# Patient Record
Sex: FEMALE | Race: WHITE | NOT HISPANIC OR LATINO | Employment: FULL TIME | URBAN - METROPOLITAN AREA
[De-identification: names, ages, dates, MRNs, and addresses within clinical notes are randomized per-mention and may not be internally consistent; named-entity substitution may affect disease eponyms.]

---

## 2018-04-13 RX ORDER — HYDROCHLOROTHIAZIDE 25 MG/1
25 TABLET ORAL DAILY
COMMUNITY
End: 2022-03-09

## 2018-04-16 ENCOUNTER — OFFICE VISIT (OUTPATIENT)
Dept: NEUROSURGERY | Facility: CLINIC | Age: 48
End: 2018-04-16

## 2018-04-16 ENCOUNTER — PREP FOR SURGERY (OUTPATIENT)
Dept: OTHER | Facility: HOSPITAL | Age: 48
End: 2018-04-16

## 2018-04-16 VITALS
DIASTOLIC BLOOD PRESSURE: 88 MMHG | SYSTOLIC BLOOD PRESSURE: 124 MMHG | HEART RATE: 74 BPM | HEIGHT: 65 IN | WEIGHT: 180 LBS | RESPIRATION RATE: 16 BRPM | BODY MASS INDEX: 29.99 KG/M2 | OXYGEN SATURATION: 98 %

## 2018-04-16 DIAGNOSIS — G54.9 MYELORADICULOPATHY: Primary | ICD-10-CM

## 2018-04-16 DIAGNOSIS — M50.20 CERVICAL DISC HERNIATION: Primary | ICD-10-CM

## 2018-04-16 PROCEDURE — 99204 OFFICE O/P NEW MOD 45 MIN: CPT | Performed by: NEUROLOGICAL SURGERY

## 2018-04-16 RX ORDER — HYDROCODONE BITARTRATE AND ACETAMINOPHEN 7.5; 325 MG/1; MG/1
1 TABLET ORAL ONCE
Status: CANCELLED | OUTPATIENT
Start: 2018-04-16 | End: 2018-04-16

## 2018-04-16 RX ORDER — GLIPIZIDE 10 MG/1
10 TABLET, FILM COATED, EXTENDED RELEASE ORAL 2 TIMES DAILY
COMMUNITY
End: 2021-07-21

## 2018-04-16 RX ORDER — IBUPROFEN 800 MG/1
800 TABLET ORAL ONCE
Status: CANCELLED | OUTPATIENT
Start: 2018-04-16 | End: 2018-04-16

## 2018-04-16 RX ORDER — CEFAZOLIN SODIUM 2 G/100ML
2 INJECTION, SOLUTION INTRAVENOUS ONCE
Status: CANCELLED | OUTPATIENT
Start: 2018-04-16 | End: 2018-04-16

## 2018-04-16 RX ORDER — ACETAMINOPHEN 325 MG/1
650 TABLET ORAL ONCE
Status: CANCELLED | OUTPATIENT
Start: 2018-04-16 | End: 2018-04-16

## 2018-04-16 RX ORDER — SODIUM CHLORIDE 0.9 % (FLUSH) 0.9 %
1-10 SYRINGE (ML) INJECTION AS NEEDED
Status: CANCELLED | OUTPATIENT
Start: 2018-04-16

## 2018-04-16 RX ORDER — FAMOTIDINE 20 MG/1
20 TABLET, FILM COATED ORAL
Status: CANCELLED | OUTPATIENT
Start: 2018-04-16

## 2018-04-16 RX ORDER — CHLORHEXIDINE GLUCONATE 4 G/100ML
SOLUTION TOPICAL
Qty: 120 ML | Refills: 0 | Status: SHIPPED | OUTPATIENT
Start: 2018-04-16 | End: 2018-05-09 | Stop reason: HOSPADM

## 2018-04-16 RX ORDER — SODIUM CHLORIDE AND POTASSIUM CHLORIDE 150; 900 MG/100ML; MG/100ML
100 INJECTION, SOLUTION INTRAVENOUS CONTINUOUS
Status: CANCELLED | OUTPATIENT
Start: 2018-04-16

## 2018-04-16 NOTE — PROGRESS NOTES
"  NAME: LEOBARDO MANNING   DOS: 2018  : 1970  PCP: SINCERE Kauffman    Chief Complaint: Cervical neck pain and left upper extremity pain    Chief Complaint   Patient presents with   • Neck Pain     \"grinding sensation\"   • Left arm pain     Numbness   • Headache       History of Present Illness:  47 y.o. female   This is a 47-year-old female with a history of some cervical neck pain that began a proximally 7-8 months ago she noticed increased pain a few months thereafter associate with a crick in her neck with radiation in the left upper extremity distribution in a C6 radicular pattern she denied significant improvement despite physical therapy denies any changes in her walking pattern or bowel bladder habitus.  She's been through physical therapy to no avail she is relatively sick of the pain and it lateralizes to her left shoulder and arm she denies any other neurologic issues she's here for evaluation    PMHX  Allergies:  No Known Allergies  Medications    Current Outpatient Prescriptions:   •  Hydrocodone-Acetaminophen (LORTAB) 7.5-500 MG/15ML solution, Take 15 mL by mouth., Disp: , Rfl:   •  glipiZIDE (GLUCOTROL XL) 10 MG 24 hr tablet, Take  by mouth., Disp: , Rfl:   •  hydrochlorothiazide (HYDRODIURIL) 25 MG tablet, Take 25 mg by mouth Daily., Disp: , Rfl:   •  insulin detemir (LEVEMIR) 100 UNIT/ML injection, Inject 52 Units under the skin., Disp: , Rfl:   •  Insulin Lispro (HUMALOG) 100 UNIT/ML solution pen-injector, Inject 28 Units under the skin., Disp: , Rfl:   Past Medical History:  Past Medical History:   Diagnosis Date   • Depression    • Diabetes mellitus     Controlled with medications   • GERD (gastroesophageal reflux disease)    • GERD (gastroesophageal reflux disease)    • Gout    • Headache    • Hyperlipidemia     Controlled with medications     Past Surgical History:  Past Surgical History:   Procedure Laterality Date   • CHOLECYSTECTOMY     • HYSTERECTOMY  2016     Social " Hx:  Social History   Substance Use Topics   • Smoking status: Never Smoker   • Smokeless tobacco: Never Used   • Alcohol use No     Family Hx:  Family History   Problem Relation Age of Onset   • Cancer Father      Colon Ca   • Diabetes Father    • Aneurysm Paternal Grandmother      Stomach Az     Review of Systems:        Review of Systems   Constitutional: Negative for activity change, appetite change, chills, diaphoresis, fatigue, fever and unexpected weight change.   HENT: Negative for congestion, dental problem, drooling, ear discharge, ear pain, facial swelling, hearing loss, mouth sores, nosebleeds, postnasal drip, rhinorrhea, sinus pressure, sneezing, sore throat, tinnitus, trouble swallowing and voice change.         Pt has had several Physical Therapy sessions with no improvement of symptoms.   Eyes: Negative for photophobia, pain, discharge, redness, itching and visual disturbance.   Respiratory: Negative for apnea, cough, choking, chest tightness, shortness of breath, wheezing and stridor.    Cardiovascular: Negative for chest pain, palpitations and leg swelling.   Gastrointestinal: Negative for abdominal distention, abdominal pain, anal bleeding, blood in stool, constipation, diarrhea, nausea, rectal pain and vomiting.   Endocrine: Negative for cold intolerance, heat intolerance, polydipsia, polyphagia and polyuria.   Genitourinary: Negative for decreased urine volume, difficulty urinating, dysuria, enuresis, flank pain, frequency, genital sores, hematuria and urgency.   Musculoskeletal: Positive for myalgias (Left arm pain/numbness/tingling/weakness) and neck pain. Negative for arthralgias, back pain, gait problem, joint swelling and neck stiffness.   Skin: Negative for color change, pallor, rash and wound.   Allergic/Immunologic: Negative for environmental allergies, food allergies and immunocompromised state.   Neurological: Positive for dizziness, weakness, numbness and headaches. Negative for  tremors, seizures, syncope, facial asymmetry, speech difficulty and light-headedness.   Hematological: Negative for adenopathy. Does not bruise/bleed easily.   Psychiatric/Behavioral: Negative for agitation, behavioral problems, confusion, decreased concentration, dysphoric mood, hallucinations, self-injury, sleep disturbance and suicidal ideas. The patient is not nervous/anxious and is not hyperactive.    All other systems reviewed and are negative.       I have reviewed this note template and all pertinent parts of the review of systems social, family history, surgical history and medication list    Physical Examination:  Vitals:    04/16/18 1537   BP: 124/88   Pulse: 74   Resp: 16   SpO2: 98%      General Appearance:   Well developed, well nourished, well groomed, alert, and cooperative.  Neurological examination:  Neurologic Exam    Vital signs were reviewed and documented in the chart  Patient appeared in good neurologic function with normal comprehension fluent speech  Mood and affect are normal  Sense of smell deferred    Pupils symmetric equally reactive funduscopic exam not visualized   Visual fields intact to confrontation  Extraocular movements intact  Face motor function is symmetric  Facial sensations normal  Hearing intact to finger rub hearing intact to finger rub  Tongue is midline  Palate symmetric  Swallowing normal  Shoulder shrug normal    Muscle bulk and tone normal  5 out of 5 strength no motor drift he does have some perhaps weakness in her external rotator on the left shoulder  Gait normal intact  Negative Romberg  No clonus long tract signs or myelopathy    Reflexes symmetric however there extremely brisk better probably physiologic she states they've been like that she does have a decreased C6 reflex on the left and is decreased C7 reflex on the left  No edema noted and extremities skin appears normal    She has no signs of intrinsic rotator cuff dysfunction  No signs of intrinsic hip  dysfunction  Neck is without any lesions or abnormality  Hands are warm and well perfused      Review of Imaging/DATA:  MRI was reviewed the demonstrates a 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 Senior is a 47 y.o. female   She prevents with cervical spondylitic myelopathy possibly given her very brisk asymmetric reflexes but she does have C5 VI nerve root signs I discussed with her the treatment options and unfortunately given the mild weakness the cord compression the refractory nature of her pain I think it's likely that she needs to have surgery and that's what I recommended.  I've explained to her the concept of a C5 6 anterior cervical discectomy and fusion unexplained the risks benefits and expected outcome as well as the postoperative dysphagia and hoarseness that one can Encounter she sings in her Muslim and I explained to her that it may take a few weeks to get over that she did not have any family with her but she wishes to proceed nonetheless given the significance of her pain.    I will make arrangements for a C5 6 anterior cervical discectomy and fusion for LEFT arm pain

## 2018-04-17 PROBLEM — M50.20 CERVICAL DISC HERNIATION: Status: ACTIVE | Noted: 2018-04-17

## 2018-05-04 ENCOUNTER — APPOINTMENT (OUTPATIENT)
Dept: PREADMISSION TESTING | Facility: HOSPITAL | Age: 48
End: 2018-05-04

## 2018-05-04 VITALS — WEIGHT: 175 LBS | BODY MASS INDEX: 29.16 KG/M2 | HEIGHT: 65 IN

## 2018-05-04 DIAGNOSIS — M50.20 CERVICAL DISC HERNIATION: ICD-10-CM

## 2018-05-04 LAB
ANION GAP SERPL CALCULATED.3IONS-SCNC: 7 MMOL/L (ref 3–11)
BUN BLD-MCNC: 18 MG/DL (ref 9–23)
BUN/CREAT SERPL: 22.5 (ref 7–25)
CALCIUM SPEC-SCNC: 10 MG/DL (ref 8.7–10.4)
CHLORIDE SERPL-SCNC: 103 MMOL/L (ref 99–109)
CO2 SERPL-SCNC: 28 MMOL/L (ref 20–31)
CREAT BLD-MCNC: 0.8 MG/DL (ref 0.6–1.3)
DEPRECATED RDW RBC AUTO: 41.2 FL (ref 37–54)
ERYTHROCYTE [DISTWIDTH] IN BLOOD BY AUTOMATED COUNT: 13 % (ref 11.3–14.5)
GFR SERPL CREATININE-BSD FRML MDRD: 77 ML/MIN/1.73
GLUCOSE BLD-MCNC: 121 MG/DL (ref 70–100)
HBA1C MFR BLD: 8.6 % (ref 4.8–5.6)
HCT VFR BLD AUTO: 44.9 % (ref 34.5–44)
HGB BLD-MCNC: 15.3 G/DL (ref 11.5–15.5)
MCH RBC QN AUTO: 29.7 PG (ref 27–31)
MCHC RBC AUTO-ENTMCNC: 34.1 G/DL (ref 32–36)
MCV RBC AUTO: 87.2 FL (ref 80–99)
PLATELET # BLD AUTO: 268 10*3/MM3 (ref 150–450)
PMV BLD AUTO: 9.8 FL (ref 6–12)
POTASSIUM BLD-SCNC: 4.2 MMOL/L (ref 3.5–5.5)
RBC # BLD AUTO: 5.15 10*6/MM3 (ref 3.89–5.14)
SODIUM BLD-SCNC: 138 MMOL/L (ref 132–146)
WBC NRBC COR # BLD: 6.26 10*3/MM3 (ref 3.5–10.8)

## 2018-05-04 PROCEDURE — 36415 COLL VENOUS BLD VENIPUNCTURE: CPT

## 2018-05-04 PROCEDURE — 93010 ELECTROCARDIOGRAM REPORT: CPT | Performed by: INTERNAL MEDICINE

## 2018-05-04 PROCEDURE — 87081 CULTURE SCREEN ONLY: CPT | Performed by: ANESTHESIOLOGY

## 2018-05-04 PROCEDURE — 85027 COMPLETE CBC AUTOMATED: CPT | Performed by: NEUROLOGICAL SURGERY

## 2018-05-04 PROCEDURE — 93005 ELECTROCARDIOGRAM TRACING: CPT

## 2018-05-04 PROCEDURE — 83036 HEMOGLOBIN GLYCOSYLATED A1C: CPT | Performed by: ANESTHESIOLOGY

## 2018-05-04 PROCEDURE — 80048 BASIC METABOLIC PNL TOTAL CA: CPT | Performed by: NEUROLOGICAL SURGERY

## 2018-05-04 RX ORDER — TURMERIC ROOT EXTRACT 500 MG
1 TABLET ORAL DAILY
COMMUNITY
End: 2022-03-09

## 2018-05-04 RX ORDER — TIZANIDINE 4 MG/1
4 TABLET ORAL NIGHTLY PRN
COMMUNITY
End: 2018-05-31

## 2018-05-04 NOTE — PAT
Bactroban and Chlorhexidine Prescription given during PAT visit, as well as written and verbal instructions given to patient during PAT visit.

## 2018-05-04 NOTE — DISCHARGE INSTRUCTIONS

## 2018-05-06 LAB — MRSA SPEC QL CULT: NORMAL

## 2018-05-08 ENCOUNTER — ANESTHESIA EVENT (OUTPATIENT)
Dept: PERIOP | Facility: HOSPITAL | Age: 48
End: 2018-05-08

## 2018-05-09 ENCOUNTER — APPOINTMENT (OUTPATIENT)
Dept: GENERAL RADIOLOGY | Facility: HOSPITAL | Age: 48
End: 2018-05-09

## 2018-05-09 ENCOUNTER — ANESTHESIA (OUTPATIENT)
Dept: PERIOP | Facility: HOSPITAL | Age: 48
End: 2018-05-09

## 2018-05-09 ENCOUNTER — HOSPITAL ENCOUNTER (OUTPATIENT)
Facility: HOSPITAL | Age: 48
Setting detail: SURGERY ADMIT
Discharge: HOME | End: 2018-05-09
Attending: NEUROLOGICAL SURGERY | Admitting: NEUROLOGICAL SURGERY

## 2018-05-09 VITALS
TEMPERATURE: 98.2 F | HEIGHT: 65 IN | SYSTOLIC BLOOD PRESSURE: 125 MMHG | RESPIRATION RATE: 16 BRPM | WEIGHT: 175 LBS | BODY MASS INDEX: 29.16 KG/M2 | DIASTOLIC BLOOD PRESSURE: 66 MMHG | OXYGEN SATURATION: 96 % | HEART RATE: 89 BPM

## 2018-05-09 DIAGNOSIS — M50.20 CERVICAL DISC HERNIATION: ICD-10-CM

## 2018-05-09 LAB
GLUCOSE BLDC GLUCOMTR-MCNC: 128 MG/DL (ref 70–130)
POTASSIUM BLDA-SCNC: 3.77 MMOL/L (ref 3.5–5.3)

## 2018-05-09 PROCEDURE — 25010000002 NEOSTIGMINE PER 0.5 MG: Performed by: NURSE ANESTHETIST, CERTIFIED REGISTERED

## 2018-05-09 PROCEDURE — C1713 ANCHOR/SCREW BN/BN,TIS/BN: HCPCS | Performed by: NEUROLOGICAL SURGERY

## 2018-05-09 PROCEDURE — 22551 ARTHRD ANT NTRBDY CERVICAL: CPT | Performed by: NEUROLOGICAL SURGERY

## 2018-05-09 PROCEDURE — 25010000002 MIDAZOLAM PER 1 MG: Performed by: NURSE ANESTHETIST, CERTIFIED REGISTERED

## 2018-05-09 PROCEDURE — 84132 ASSAY OF SERUM POTASSIUM: CPT | Performed by: ANESTHESIOLOGY

## 2018-05-09 PROCEDURE — 25010000002 ONDANSETRON PER 1 MG: Performed by: NURSE ANESTHETIST, CERTIFIED REGISTERED

## 2018-05-09 PROCEDURE — 82962 GLUCOSE BLOOD TEST: CPT

## 2018-05-09 PROCEDURE — 25010000002 DEXAMETHASONE PER 1 MG: Performed by: NURSE ANESTHETIST, CERTIFIED REGISTERED

## 2018-05-09 PROCEDURE — 25010000002 FENTANYL CITRATE (PF) 100 MCG/2ML SOLUTION: Performed by: NURSE ANESTHETIST, CERTIFIED REGISTERED

## 2018-05-09 PROCEDURE — 22845 INSERT SPINE FIXATION DEVICE: CPT | Performed by: NEUROLOGICAL SURGERY

## 2018-05-09 PROCEDURE — 25010000002 HYDROMORPHONE PER 4 MG: Performed by: NURSE ANESTHETIST, CERTIFIED REGISTERED

## 2018-05-09 PROCEDURE — 76001 HC FLUORO GREATER THAN 1 HOUR: CPT

## 2018-05-09 PROCEDURE — 20931 SP BONE ALGRFT STRUCT ADD-ON: CPT | Performed by: NEUROLOGICAL SURGERY

## 2018-05-09 PROCEDURE — 25010000003 CEFAZOLIN IN DEXTROSE 2-4 GM/100ML-% SOLUTION: Performed by: NEUROLOGICAL SURGERY

## 2018-05-09 PROCEDURE — 25010000002 PHENYLEPHRINE PER 1 ML: Performed by: NURSE ANESTHETIST, CERTIFIED REGISTERED

## 2018-05-09 PROCEDURE — 25010000002 PROPOFOL 10 MG/ML EMULSION: Performed by: NURSE ANESTHETIST, CERTIFIED REGISTERED

## 2018-05-09 PROCEDURE — 76000 FLUOROSCOPY <1 HR PHYS/QHP: CPT

## 2018-05-09 DEVICE — SCRW SKYLINE VAR SD 12MM: Type: IMPLANTABLE DEVICE | Status: FUNCTIONAL

## 2018-05-09 DEVICE — PLT SKYLINE 1LEVEL 12MM: Type: IMPLANTABLE DEVICE | Status: FUNCTIONAL

## 2018-05-09 DEVICE — ALLOGRFT SPINE ACDF VERTIGRAFT WEDGE FZ 7DEG 7.22X5.75MM: Type: IMPLANTABLE DEVICE | Site: SPINE CERVICAL | Status: FUNCTIONAL

## 2018-05-09 RX ORDER — PROMETHAZINE HYDROCHLORIDE 25 MG/ML
6.25 INJECTION, SOLUTION INTRAMUSCULAR; INTRAVENOUS ONCE AS NEEDED
Status: DISCONTINUED | OUTPATIENT
Start: 2018-05-09 | End: 2018-05-09 | Stop reason: HOSPADM

## 2018-05-09 RX ORDER — ONDANSETRON 2 MG/ML
INJECTION INTRAMUSCULAR; INTRAVENOUS AS NEEDED
Status: DISCONTINUED | OUTPATIENT
Start: 2018-05-09 | End: 2018-05-09 | Stop reason: SURG

## 2018-05-09 RX ORDER — LIDOCAINE HYDROCHLORIDE AND EPINEPHRINE 5; 5 MG/ML; UG/ML
INJECTION, SOLUTION INFILTRATION; PERINEURAL AS NEEDED
Status: DISCONTINUED | OUTPATIENT
Start: 2018-05-09 | End: 2018-05-09 | Stop reason: HOSPADM

## 2018-05-09 RX ORDER — SODIUM CHLORIDE AND POTASSIUM CHLORIDE 150; 900 MG/100ML; MG/100ML
100 INJECTION, SOLUTION INTRAVENOUS CONTINUOUS
Status: DISCONTINUED | OUTPATIENT
Start: 2018-05-09 | End: 2018-05-09 | Stop reason: HOSPADM

## 2018-05-09 RX ORDER — IBUPROFEN 800 MG/1
800 TABLET ORAL ONCE
Status: COMPLETED | OUTPATIENT
Start: 2018-05-09 | End: 2018-05-09

## 2018-05-09 RX ORDER — PROPOFOL 10 MG/ML
VIAL (ML) INTRAVENOUS AS NEEDED
Status: DISCONTINUED | OUTPATIENT
Start: 2018-05-09 | End: 2018-05-09 | Stop reason: SURG

## 2018-05-09 RX ORDER — FENTANYL CITRATE 50 UG/ML
INJECTION, SOLUTION INTRAMUSCULAR; INTRAVENOUS AS NEEDED
Status: DISCONTINUED | OUTPATIENT
Start: 2018-05-09 | End: 2018-05-09 | Stop reason: SURG

## 2018-05-09 RX ORDER — ROCURONIUM BROMIDE 10 MG/ML
INJECTION, SOLUTION INTRAVENOUS AS NEEDED
Status: DISCONTINUED | OUTPATIENT
Start: 2018-05-09 | End: 2018-05-09 | Stop reason: SURG

## 2018-05-09 RX ORDER — HYDROMORPHONE HYDROCHLORIDE 1 MG/ML
0.5 INJECTION, SOLUTION INTRAMUSCULAR; INTRAVENOUS; SUBCUTANEOUS
Status: DISCONTINUED | OUTPATIENT
Start: 2018-05-09 | End: 2018-05-09 | Stop reason: HOSPADM

## 2018-05-09 RX ORDER — MAGNESIUM HYDROXIDE 1200 MG/15ML
LIQUID ORAL AS NEEDED
Status: DISCONTINUED | OUTPATIENT
Start: 2018-05-09 | End: 2018-05-09 | Stop reason: HOSPADM

## 2018-05-09 RX ORDER — PROMETHAZINE HYDROCHLORIDE 25 MG/1
25 SUPPOSITORY RECTAL ONCE AS NEEDED
Status: DISCONTINUED | OUTPATIENT
Start: 2018-05-09 | End: 2018-05-09 | Stop reason: HOSPADM

## 2018-05-09 RX ORDER — ONDANSETRON 2 MG/ML
4 INJECTION INTRAMUSCULAR; INTRAVENOUS ONCE AS NEEDED
Status: DISCONTINUED | OUTPATIENT
Start: 2018-05-09 | End: 2018-05-09 | Stop reason: HOSPADM

## 2018-05-09 RX ORDER — SODIUM CHLORIDE 0.9 % (FLUSH) 0.9 %
1-10 SYRINGE (ML) INJECTION AS NEEDED
Status: DISCONTINUED | OUTPATIENT
Start: 2018-05-09 | End: 2018-05-09

## 2018-05-09 RX ORDER — ACETAMINOPHEN 325 MG/1
650 TABLET ORAL ONCE
Status: COMPLETED | OUTPATIENT
Start: 2018-05-09 | End: 2018-05-09

## 2018-05-09 RX ORDER — MIDAZOLAM HYDROCHLORIDE 1 MG/ML
INJECTION INTRAMUSCULAR; INTRAVENOUS AS NEEDED
Status: DISCONTINUED | OUTPATIENT
Start: 2018-05-09 | End: 2018-05-09 | Stop reason: SURG

## 2018-05-09 RX ORDER — FENTANYL CITRATE 50 UG/ML
50 INJECTION, SOLUTION INTRAMUSCULAR; INTRAVENOUS
Status: DISCONTINUED | OUTPATIENT
Start: 2018-05-09 | End: 2018-05-09 | Stop reason: HOSPADM

## 2018-05-09 RX ORDER — HYDROCODONE BITARTRATE AND ACETAMINOPHEN 7.5; 325 MG/1; MG/1
1 TABLET ORAL EVERY 6 HOURS PRN
Qty: 40 TABLET | Refills: 0 | Status: SHIPPED | OUTPATIENT
Start: 2018-05-09 | End: 2018-05-31

## 2018-05-09 RX ORDER — OXYCODONE HYDROCHLORIDE AND ACETAMINOPHEN 5; 325 MG/1; MG/1
1 TABLET ORAL ONCE AS NEEDED
Status: COMPLETED | OUTPATIENT
Start: 2018-05-09 | End: 2018-05-09

## 2018-05-09 RX ORDER — SODIUM CHLORIDE, SODIUM LACTATE, POTASSIUM CHLORIDE, CALCIUM CHLORIDE 600; 310; 30; 20 MG/100ML; MG/100ML; MG/100ML; MG/100ML
9 INJECTION, SOLUTION INTRAVENOUS CONTINUOUS
Status: DISCONTINUED | OUTPATIENT
Start: 2018-05-09 | End: 2018-05-09 | Stop reason: HOSPADM

## 2018-05-09 RX ORDER — FAMOTIDINE 20 MG/1
20 TABLET, FILM COATED ORAL ONCE
Status: DISCONTINUED | OUTPATIENT
Start: 2018-05-09 | End: 2018-05-09

## 2018-05-09 RX ORDER — PROMETHAZINE HYDROCHLORIDE 25 MG/1
25 TABLET ORAL ONCE AS NEEDED
Status: DISCONTINUED | OUTPATIENT
Start: 2018-05-09 | End: 2018-05-09 | Stop reason: HOSPADM

## 2018-05-09 RX ORDER — GLYCOPYRROLATE 0.2 MG/ML
INJECTION INTRAMUSCULAR; INTRAVENOUS AS NEEDED
Status: DISCONTINUED | OUTPATIENT
Start: 2018-05-09 | End: 2018-05-09 | Stop reason: SURG

## 2018-05-09 RX ORDER — SCOLOPAMINE TRANSDERMAL SYSTEM 1 MG/1
1 PATCH, EXTENDED RELEASE TRANSDERMAL
Status: DISCONTINUED | OUTPATIENT
Start: 2018-05-09 | End: 2018-05-09 | Stop reason: HOSPADM

## 2018-05-09 RX ORDER — LIDOCAINE HYDROCHLORIDE 10 MG/ML
INJECTION, SOLUTION INFILTRATION; PERINEURAL AS NEEDED
Status: DISCONTINUED | OUTPATIENT
Start: 2018-05-09 | End: 2018-05-09 | Stop reason: SURG

## 2018-05-09 RX ORDER — DEXAMETHASONE SODIUM PHOSPHATE 4 MG/ML
INJECTION, SOLUTION INTRA-ARTICULAR; INTRALESIONAL; INTRAMUSCULAR; INTRAVENOUS; SOFT TISSUE AS NEEDED
Status: DISCONTINUED | OUTPATIENT
Start: 2018-05-09 | End: 2018-05-09 | Stop reason: SURG

## 2018-05-09 RX ORDER — SODIUM CHLORIDE 0.9 % (FLUSH) 0.9 %
1-10 SYRINGE (ML) INJECTION AS NEEDED
Status: DISCONTINUED | OUTPATIENT
Start: 2018-05-09 | End: 2018-05-09 | Stop reason: HOSPADM

## 2018-05-09 RX ORDER — HYDROCODONE BITARTRATE AND ACETAMINOPHEN 7.5; 325 MG/1; MG/1
1 TABLET ORAL ONCE
Status: COMPLETED | OUTPATIENT
Start: 2018-05-09 | End: 2018-05-09

## 2018-05-09 RX ORDER — FAMOTIDINE 20 MG/1
20 TABLET, FILM COATED ORAL
Status: COMPLETED | OUTPATIENT
Start: 2018-05-09 | End: 2018-05-09

## 2018-05-09 RX ORDER — CEFAZOLIN SODIUM 2 G/100ML
2 INJECTION, SOLUTION INTRAVENOUS ONCE
Status: COMPLETED | OUTPATIENT
Start: 2018-05-09 | End: 2018-05-09

## 2018-05-09 RX ORDER — LIDOCAINE HYDROCHLORIDE 10 MG/ML
0.5 INJECTION, SOLUTION EPIDURAL; INFILTRATION; INTRACAUDAL; PERINEURAL ONCE AS NEEDED
Status: COMPLETED | OUTPATIENT
Start: 2018-05-09 | End: 2018-05-09

## 2018-05-09 RX ADMIN — PHENYLEPHRINE HYDROCHLORIDE 100 MCG: 10 INJECTION INTRAVENOUS at 12:16

## 2018-05-09 RX ADMIN — PROPOFOL 150 MG: 10 INJECTION, EMULSION INTRAVENOUS at 11:07

## 2018-05-09 RX ADMIN — HYDROMORPHONE HYDROCHLORIDE 0.5 MG: 1 INJECTION, SOLUTION INTRAMUSCULAR; INTRAVENOUS; SUBCUTANEOUS at 14:06

## 2018-05-09 RX ADMIN — MIDAZOLAM HYDROCHLORIDE 2 MG: 1 INJECTION, SOLUTION INTRAMUSCULAR; INTRAVENOUS at 10:58

## 2018-05-09 RX ADMIN — ROCURONIUM BROMIDE 40 MG: 10 SOLUTION INTRAVENOUS at 11:07

## 2018-05-09 RX ADMIN — SODIUM CHLORIDE, POTASSIUM CHLORIDE, SODIUM LACTATE AND CALCIUM CHLORIDE: 600; 310; 30; 20 INJECTION, SOLUTION INTRAVENOUS at 12:43

## 2018-05-09 RX ADMIN — HYDROCODONE BITARTRATE AND ACETAMINOPHEN 1 TABLET: 7.5; 325 TABLET ORAL at 09:08

## 2018-05-09 RX ADMIN — ACETAMINOPHEN 650 MG: 325 TABLET, FILM COATED ORAL at 09:08

## 2018-05-09 RX ADMIN — Medication 3 MG: at 12:45

## 2018-05-09 RX ADMIN — SCOPALAMINE 1 PATCH: 1 PATCH, EXTENDED RELEASE TRANSDERMAL at 09:06

## 2018-05-09 RX ADMIN — DEXAMETHASONE SODIUM PHOSPHATE 8 MG: 4 INJECTION, SOLUTION INTRAMUSCULAR; INTRAVENOUS at 11:12

## 2018-05-09 RX ADMIN — OXYCODONE AND ACETAMINOPHEN 1 TABLET: 5; 325 TABLET ORAL at 14:02

## 2018-05-09 RX ADMIN — PHENYLEPHRINE HYDROCHLORIDE 100 MCG: 10 INJECTION INTRAVENOUS at 11:27

## 2018-05-09 RX ADMIN — FAMOTIDINE 20 MG: 20 TABLET ORAL at 09:08

## 2018-05-09 RX ADMIN — SODIUM CHLORIDE, POTASSIUM CHLORIDE, SODIUM LACTATE AND CALCIUM CHLORIDE 9 ML/HR: 600; 310; 30; 20 INJECTION, SOLUTION INTRAVENOUS at 08:55

## 2018-05-09 RX ADMIN — PHENYLEPHRINE HYDROCHLORIDE 100 MCG: 10 INJECTION INTRAVENOUS at 12:29

## 2018-05-09 RX ADMIN — HYDROMORPHONE HYDROCHLORIDE 0.5 MG: 1 INJECTION, SOLUTION INTRAMUSCULAR; INTRAVENOUS; SUBCUTANEOUS at 13:34

## 2018-05-09 RX ADMIN — PHENYLEPHRINE HYDROCHLORIDE 100 MCG: 10 INJECTION INTRAVENOUS at 12:30

## 2018-05-09 RX ADMIN — IBUPROFEN 800 MG: 800 TABLET ORAL at 09:08

## 2018-05-09 RX ADMIN — HYDROMORPHONE HYDROCHLORIDE 0.5 MG: 1 INJECTION, SOLUTION INTRAMUSCULAR; INTRAVENOUS; SUBCUTANEOUS at 13:39

## 2018-05-09 RX ADMIN — LIDOCAINE HYDROCHLORIDE 50 MG: 10 INJECTION, SOLUTION INFILTRATION; PERINEURAL at 11:07

## 2018-05-09 RX ADMIN — ROCURONIUM BROMIDE 15 MG: 10 SOLUTION INTRAVENOUS at 12:15

## 2018-05-09 RX ADMIN — EPHEDRINE SULFATE 10 MG: 50 INJECTION INTRAMUSCULAR; INTRAVENOUS; SUBCUTANEOUS at 11:15

## 2018-05-09 RX ADMIN — FENTANYL CITRATE 100 MCG: 50 INJECTION, SOLUTION INTRAMUSCULAR; INTRAVENOUS at 11:07

## 2018-05-09 RX ADMIN — LIDOCAINE HYDROCHLORIDE 0.2 ML: 10 INJECTION, SOLUTION EPIDURAL; INFILTRATION; INTRACAUDAL; PERINEURAL at 08:55

## 2018-05-09 RX ADMIN — CEFAZOLIN SODIUM 2 G: 2 INJECTION, SOLUTION INTRAVENOUS at 10:59

## 2018-05-09 RX ADMIN — ROCURONIUM BROMIDE 10 MG: 10 SOLUTION INTRAVENOUS at 11:34

## 2018-05-09 RX ADMIN — PHENYLEPHRINE HYDROCHLORIDE 100 MCG: 10 INJECTION INTRAVENOUS at 11:52

## 2018-05-09 RX ADMIN — EPHEDRINE SULFATE 10 MG: 50 INJECTION INTRAMUSCULAR; INTRAVENOUS; SUBCUTANEOUS at 11:10

## 2018-05-09 RX ADMIN — GLYCOPYRROLATE 0.4 MG: 0.2 INJECTION, SOLUTION INTRAMUSCULAR; INTRAVENOUS at 12:45

## 2018-05-09 RX ADMIN — ONDANSETRON 4 MG: 2 INJECTION INTRAMUSCULAR; INTRAVENOUS at 12:39

## 2018-05-09 NOTE — H&P (VIEW-ONLY)
"  NAME: LEOBARDO MANNING   DOS: 2018  : 1970  PCP: SINCERE Kauffman    Chief Complaint: Cervical neck pain and left upper extremity pain    Chief Complaint   Patient presents with   • Neck Pain     \"grinding sensation\"   • Left arm pain     Numbness   • Headache       History of Present Illness:  47 y.o. female   This is a 47-year-old female with a history of some cervical neck pain that began a proximally 7-8 months ago she noticed increased pain a few months thereafter associate with a crick in her neck with radiation in the left upper extremity distribution in a C6 radicular pattern she denied significant improvement despite physical therapy denies any changes in her walking pattern or bowel bladder habitus.  She's been through physical therapy to no avail she is relatively sick of the pain and it lateralizes to her left shoulder and arm she denies any other neurologic issues she's here for evaluation    PMHX  Allergies:  No Known Allergies  Medications    Current Outpatient Prescriptions:   •  Hydrocodone-Acetaminophen (LORTAB) 7.5-500 MG/15ML solution, Take 15 mL by mouth., Disp: , Rfl:   •  glipiZIDE (GLUCOTROL XL) 10 MG 24 hr tablet, Take  by mouth., Disp: , Rfl:   •  hydrochlorothiazide (HYDRODIURIL) 25 MG tablet, Take 25 mg by mouth Daily., Disp: , Rfl:   •  insulin detemir (LEVEMIR) 100 UNIT/ML injection, Inject 52 Units under the skin., Disp: , Rfl:   •  Insulin Lispro (HUMALOG) 100 UNIT/ML solution pen-injector, Inject 28 Units under the skin., Disp: , Rfl:   Past Medical History:  Past Medical History:   Diagnosis Date   • Depression    • Diabetes mellitus     Controlled with medications   • GERD (gastroesophageal reflux disease)    • GERD (gastroesophageal reflux disease)    • Gout    • Headache    • Hyperlipidemia     Controlled with medications     Past Surgical History:  Past Surgical History:   Procedure Laterality Date   • CHOLECYSTECTOMY     • HYSTERECTOMY  2016     Social " Hx:  Social History   Substance Use Topics   • Smoking status: Never Smoker   • Smokeless tobacco: Never Used   • Alcohol use No     Family Hx:  Family History   Problem Relation Age of Onset   • Cancer Father      Colon Ca   • Diabetes Father    • Aneurysm Paternal Grandmother      Stomach Az     Review of Systems:        Review of Systems   Constitutional: Negative for activity change, appetite change, chills, diaphoresis, fatigue, fever and unexpected weight change.   HENT: Negative for congestion, dental problem, drooling, ear discharge, ear pain, facial swelling, hearing loss, mouth sores, nosebleeds, postnasal drip, rhinorrhea, sinus pressure, sneezing, sore throat, tinnitus, trouble swallowing and voice change.         Pt has had several Physical Therapy sessions with no improvement of symptoms.   Eyes: Negative for photophobia, pain, discharge, redness, itching and visual disturbance.   Respiratory: Negative for apnea, cough, choking, chest tightness, shortness of breath, wheezing and stridor.    Cardiovascular: Negative for chest pain, palpitations and leg swelling.   Gastrointestinal: Negative for abdominal distention, abdominal pain, anal bleeding, blood in stool, constipation, diarrhea, nausea, rectal pain and vomiting.   Endocrine: Negative for cold intolerance, heat intolerance, polydipsia, polyphagia and polyuria.   Genitourinary: Negative for decreased urine volume, difficulty urinating, dysuria, enuresis, flank pain, frequency, genital sores, hematuria and urgency.   Musculoskeletal: Positive for myalgias (Left arm pain/numbness/tingling/weakness) and neck pain. Negative for arthralgias, back pain, gait problem, joint swelling and neck stiffness.   Skin: Negative for color change, pallor, rash and wound.   Allergic/Immunologic: Negative for environmental allergies, food allergies and immunocompromised state.   Neurological: Positive for dizziness, weakness, numbness and headaches. Negative for  tremors, seizures, syncope, facial asymmetry, speech difficulty and light-headedness.   Hematological: Negative for adenopathy. Does not bruise/bleed easily.   Psychiatric/Behavioral: Negative for agitation, behavioral problems, confusion, decreased concentration, dysphoric mood, hallucinations, self-injury, sleep disturbance and suicidal ideas. The patient is not nervous/anxious and is not hyperactive.    All other systems reviewed and are negative.       I have reviewed this note template and all pertinent parts of the review of systems social, family history, surgical history and medication list    Physical Examination:  Vitals:    04/16/18 1537   BP: 124/88   Pulse: 74   Resp: 16   SpO2: 98%      General Appearance:   Well developed, well nourished, well groomed, alert, and cooperative.  Neurological examination:  Neurologic Exam    Vital signs were reviewed and documented in the chart  Patient appeared in good neurologic function with normal comprehension fluent speech  Mood and affect are normal  Sense of smell deferred    Pupils symmetric equally reactive funduscopic exam not visualized   Visual fields intact to confrontation  Extraocular movements intact  Face motor function is symmetric  Facial sensations normal  Hearing intact to finger rub hearing intact to finger rub  Tongue is midline  Palate symmetric  Swallowing normal  Shoulder shrug normal    Muscle bulk and tone normal  5 out of 5 strength no motor drift he does have some perhaps weakness in her external rotator on the left shoulder  Gait normal intact  Negative Romberg  No clonus long tract signs or myelopathy    Reflexes symmetric however there extremely brisk better probably physiologic she states they've been like that she does have a decreased C6 reflex on the left and is decreased C7 reflex on the left  No edema noted and extremities skin appears normal    She has no signs of intrinsic rotator cuff dysfunction  No signs of intrinsic hip  dysfunction  Neck is without any lesions or abnormality  Hands are warm and well perfused      Review of Imaging/DATA:  MRI was reviewed the demonstrates a 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 Senior is a 47 y.o. female   She prevents with cervical spondylitic myelopathy possibly given her very brisk asymmetric reflexes but she does have C5 VI nerve root signs I discussed with her the treatment options and unfortunately given the mild weakness the cord compression the refractory nature of her pain I think it's likely that she needs to have surgery and that's what I recommended.  I've explained to her the concept of a C5 6 anterior cervical discectomy and fusion unexplained the risks benefits and expected outcome as well as the postoperative dysphagia and hoarseness that one can Encounter she sings in her Denominational and I explained to her that it may take a few weeks to get over that she did not have any family with her but she wishes to proceed nonetheless given the significance of her pain.    I will make arrangements for a C5 6 anterior cervical discectomy and fusion for LEFT arm pain

## 2018-05-09 NOTE — ANESTHESIA PREPROCEDURE EVALUATION
Anesthesia Evaluation     history of anesthetic complications:               Airway   Mallampati: I  TM distance: >3 FB  Neck ROM: full  No difficulty expected  Dental      Pulmonary    Cardiovascular     ECG reviewed    (+) hypertension, hyperlipidemia,       Neuro/Psych  (+) psychiatric history Anxiety and Depression,     GI/Hepatic/Renal/Endo    (+)   diabetes mellitus,     Musculoskeletal     (+) neck pain,   Abdominal    Substance History      OB/GYN          Other                        Anesthesia Plan    ASA 3     general     intravenous induction   Anesthetic plan and risks discussed with patient.    Plan discussed with CRNA.

## 2018-05-09 NOTE — ANESTHESIA POSTPROCEDURE EVALUATION
"Patient: Pricilla Senior    Procedure Summary     Date:  05/09/18 Room / Location:   [facility]    Anesthesia Start:  1059 Anesthesia Stop:  1311    Procedure:  CERVICAL DISCECTOMY ANTERIOR WITH FUSION, C5 6 LEFT (Left Spine Cervical) Diagnosis:       Cervical disc herniation      (Cervical disc herniation [M50.20])    Surgeon:  Dawood Avendano MD Provider:  Angelina Mitchell MD    Anesthesia Type:  general ASA Status:  3          Anesthesia Type: general  Last vitals  BP   147/70 (05/09/18 1307)   Temp   98.3 °F (36.8 °C) (05/09/18 1307)   Pulse   (!) 121 (05/09/18 1307)   Resp   16 (05/09/18 1307)     SpO2   97 % (05/09/18 1307)     Post Anesthesia Care and Evaluation    Patient location during evaluation: PACU  Patient participation: complete - patient participated  Level of consciousness: awake and alert  Pain score: 0  Pain management: adequate  Airway patency: patent  Anesthetic complications: No anesthetic complications  PONV Status: none  Cardiovascular status: hemodynamically stable and acceptable  Respiratory status: nonlabored ventilation, acceptable and nasal cannula  Hydration status: acceptable    Comments: Pt transported to PACU with O2 via nasal cannula at 4 L/MIN. Vital signs stable.  Pt shows no signs of distress.  Report given to PACU RN. /70 (BP Location: Right arm, Patient Position: Lying)   Pulse (!) 121   Temp 98.3 °F (36.8 °C) (Temporal Artery )   Resp 16   Ht 165.1 cm (65\")   Wt 79.4 kg (175 lb)   SpO2 97%   Breastfeeding? No   BMI 29.12 kg/m²         "

## 2018-05-09 NOTE — ANESTHESIA PROCEDURE NOTES
Airway  Urgency: elective    Date/Time: 5/9/2018 11:09 AM  Airway not difficult    General Information and Staff    Patient location during procedure: OR  CRNA: FREDERIC PASTOR    Indications and Patient Condition  Indications for airway management: airway protection    Preoxygenated: yes  MILS not maintained throughout  Mask difficulty assessment: 1 - vent by mask    Final Airway Details  Final airway type: endotracheal airway      Successful airway: ETT  Cuffed: yes   Successful intubation technique: video laryngoscopy  Facilitating devices/methods: intubating stylet  Endotracheal tube insertion site: oral  Blade: Elis  Blade size: #3  ETT size: 7.0 mm  Cormack-Lehane Classification: grade I - full view of glottis  Placement verified by: chest auscultation and capnometry   Cuff volume (mL): 6  Measured from: teeth  ETT to teeth (cm): 21  Number of attempts at approach: 1    Additional Comments  Pt positioned with shoulder roll by PA prior to induction.  Smooth IV induction.  Atraumatic ET intubation with glidescope to avoid cervical spine manipulation.  Dentition unchanged.  Negative epigastric sounds, Breath sound equal bilaterally with symmetric chest rise and fall

## 2018-05-09 NOTE — OP NOTE
Preoperative diagnosis cervical disc disease C5 6    Postoperative diagnosis same C5 6    Procedures  1.  Anterior cervical discectomy C5 6  2.  Anterior arthrodesis C5 6  3.  Placement of the vG2 bone graft 5 mm  4.  Anterior segmental instrumentation using Depuy Slimline plate and screws 12 mm, 40 mm plate  5.  Fluoroscopy to confirm level intraoperative    Gen. endotracheal anesthesia    Surgeon Dawood Avendano M.D.  Assistant Vickey Russo    Minimal blood loss  Applications none  Procedure in detail    After formal written consent was obtained explaining risks and benefits of procedure patient was taken to operating room.  All bony prominences and genitalia were padded to prevent neurologic injury.  Preoperative antimicrobial prophylaxis was given per protocol.  Patient was placed in neutral C-spine and prepped and draped in usual sterile manner.  Anterior incision was made after local infiltration per record.    Dissection was carried down to skin and subcutaneous tissues.  The carotid artery and tracheoesophageal bundle were gently dissected and mobilized respectively to allow access to the anterior longitudinal spine and ligament.  At this point in time soft tissues were cleared using blunt dissection fluoroscopic guidance identified the correct levels.  At this point in time a thorough discectomy was performed after placement of distraction pins.  The posterior ligament was identified after thorough discectomy and drilled down using high-speed bur the posterior longitudinal ligament was removed and the disc space was removed and explored to ensure adequate decompression of the nerves and nerve roots respectively.  Fluoroscopic guidance confirmed correct levels and decompression.  It was noted at the time of surgery dense osteophytic bone spurs were noted in the lateral recess.    At this point time the anterior vg2 bone graft was placed after being milled to the appropriate size, and the anterior plating  system was placed and torqued to appropriate tightness.  Fluoroscopic imaging identified again the correct level meticulous hemostasis maintained and the skin was closed in layers.    I performed the entire surgery till the closure of the skin.

## 2018-05-09 NOTE — INTERVAL H&P NOTE
"Pre-Op H&P (See Recent Office Note Attached for Full H&P)    Chief complaint: Neck pain into LUE with sensory alteration    Review of Systems:  General ROS:  no fever, chills, rashes, No change since last office visit  Cardiovascular ROS: no chest pain or dyspnea on exertion  Respiratory ROS: no cough, shortness of breath, or wheezing    Meds:    No current facility-administered medications on file prior to encounter.      Current Outpatient Prescriptions on File Prior to Encounter   Medication Sig Dispense Refill   • chlorhexidine (HIBICLENS) 4 % external liquid Shower each day with solution for 5 days beginning 5 days before surgery. 120 mL 0   • glipiZIDE (GLUCOTROL XL) 10 MG 24 hr tablet Take 10 mg by mouth 2 (Two) Times a Day.     • hydrochlorothiazide (HYDRODIURIL) 25 MG tablet Take 25 mg by mouth Daily. Only takes daily not BID like bottle states     • insulin detemir (LEVEMIR) 100 UNIT/ML injection Inject 55 Units under the skin Every Night.         Vital Signs:  /62 (BP Location: Right arm, Patient Position: Sitting)   Pulse 76   Temp 98.3 °F (36.8 °C) (Temporal Artery )   Resp 18   Ht 165.1 cm (65\")   Wt 79.4 kg (175 lb)   SpO2 95%   Breastfeeding? No   BMI 29.12 kg/m²     Physical Exam:    CV:  S1S2 regular rate and rhythm, no murmur               Resp:  Clear to auscultation; respirations regular, even and unlabored    Results Review:    I reviewed the patient's new clinical results.    Cancer Staging (if applicable)  Cancer Patient: __ yes x_no __unknown; If yes, clinical stage T:__ N:__M:__, stage group or __N/A    Dona Denney, APRN  5/9/2018   9:50 AM    "

## 2018-05-11 ENCOUNTER — TELEPHONE (OUTPATIENT)
Dept: NEUROSURGERY | Facility: CLINIC | Age: 48
End: 2018-05-11

## 2018-05-11 NOTE — TELEPHONE ENCOUNTER
Provider:  Juan Alberto  Caller: Pricilla  Time of call:  [id number]  Phone #:  [phone]  Surgery:  ACDF C5-6 left  Surgery Date:  05/09/18  Last visit:   04/16/18  Next visit: tbd    Reason for call:       Pt states she believes she is allergic to the adhesive and or antispetic from her surgery, pt states she woke up with a rash.     Please advise on what cream/meds to send to pharmacy.

## 2018-05-11 NOTE — TELEPHONE ENCOUNTER
"Spoke with patient: she is POD 2. She is broken out in a rash around her incision and into her chest.   The rash is bumpy without blisters, but it is itching.     From her description, it sounds like she was allergic to the chloraprep, not the ioban. She was instructed to take benadryl for the itching and that she may use steroid cream on her chest for the rash, but that she should keep all creams at least 3\" from her incision  "

## 2018-05-31 ENCOUNTER — OFFICE VISIT (OUTPATIENT)
Dept: NEUROSURGERY | Facility: CLINIC | Age: 48
End: 2018-05-31

## 2018-05-31 VITALS
HEIGHT: 65 IN | TEMPERATURE: 97.1 F | WEIGHT: 174 LBS | DIASTOLIC BLOOD PRESSURE: 70 MMHG | SYSTOLIC BLOOD PRESSURE: 122 MMHG | BODY MASS INDEX: 28.99 KG/M2

## 2018-05-31 DIAGNOSIS — M50.20 CERVICAL DISC HERNIATION: Primary | ICD-10-CM

## 2018-05-31 PROCEDURE — 99024 POSTOP FOLLOW-UP VISIT: CPT | Performed by: PHYSICIAN ASSISTANT

## 2018-05-31 NOTE — PROGRESS NOTES
Subjective   Patient ID: Pricilla Senior is a 47 y.o. female is here today for follow-up for wound check.    HPI:      Patient is a nice 47-year-old female who is wanted to the neurosurgical practice for having had a C5 6 anterior cervical discectomy and fusion performed on 5/9/2018.  Patient states the arm pain.  She had prior surgery is gone.  Patient states that she still has a little bit of issue with a knot in her throat and some posterior cervical pain.  These are very common problems and I discussed with the patient.  These will get better over the next 6-8 weeks.  Patient is well pleased with postsurgical outcome.    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    The following portions of the patient's history were reviewed and updated as appropriate: allergies, current medications, past family history, past medical history, past social history, past surgical history and problem list.    Review of Systems   Constitutional: Negative for activity change, appetite change, chills, diaphoresis, fatigue, fever and unexpected weight change.   HENT: Negative for congestion, dental problem, drooling, ear discharge, ear pain, facial swelling, hearing loss, mouth sores, nosebleeds, postnasal drip, rhinorrhea, sinus pressure, sneezing, sore throat, tinnitus, trouble swallowing and voice change.    Eyes: Negative for photophobia, pain, discharge, redness, itching and visual disturbance.   Respiratory: Negative for apnea, cough, choking, chest tightness, shortness of breath, wheezing and stridor.    Cardiovascular: Negative for chest pain, palpitations and leg swelling.   Gastrointestinal: Negative for abdominal distention, abdominal pain, anal bleeding, blood in stool, constipation, diarrhea, nausea, rectal pain and vomiting.   Endocrine: Negative for cold intolerance, heat intolerance, polydipsia, polyphagia and polyuria.   Genitourinary: Negative for  "decreased urine volume, difficulty urinating, dysuria, enuresis, flank pain, frequency, genital sores, hematuria and urgency.   Musculoskeletal: Negative for arthralgias, back pain, gait problem, joint swelling, myalgias, neck pain and neck stiffness.   Skin: Negative for color change, pallor, rash and wound.   Allergic/Immunologic: Negative for environmental allergies, food allergies and immunocompromised state.   Neurological: Negative for dizziness, tremors, seizures, syncope, facial asymmetry, speech difficulty, weakness, light-headedness, numbness and headaches.   Hematological: Negative for adenopathy. Does not bruise/bleed easily.   Psychiatric/Behavioral: Negative for agitation, behavioral problems, confusion, decreased concentration, dysphoric mood, hallucinations, self-injury, sleep disturbance and suicidal ideas. The patient is not nervous/anxious and is not hyperactive.    All other systems reviewed and are negative.        Objective    reports that she has never smoked. She has never used smokeless tobacco. She reports that she does not drink alcohol or use drugs.   SMOKING STATUS: Nonsmoker    Physical Exam:   Vitals:/70   Temp 97.1 °F (36.2 °C) (Temporal Artery )   Ht 165.1 cm (65\")   Wt 78.9 kg (174 lb)   BMI 28.96 kg/m²    BMI: Body mass index is 28.96 kg/m².     GENEREAL:   The patient is in no acute distress, and is able to answer all questions appropriately.  Skin:  The incision is well-healed and well approximated.  No signs of infection, bleeding, or erythema.  Musculoskeletal: The patient’s strength is intact in upper and lower extremities upon direct testing.  Strength is 5 out of 5.  Shoulder abduction is 5 out of 5.  Finger extension is 5 out of 5.  Dorsiflexion and plantarflexion are equal bilaterally @ 5/5. Hip flexion against resistance.  The patient’s gait is normal without antalgia.  Neurologic: The patient is alert and oriented by 3.  Recent memory, language, attention span, " and fund of knowledge are all with within normal limits.   Sensation is equal bilaterally with no deficit.    Reflexes are 2+ at the biceps, triceps, and brachioradialis as well as the patellar and Achilles tendon bilaterally.  There is no sign of Prieto’s, clonus, or long track signs.      Assessment/Plan   Independent Review of Radiographic Studies:    No new films reviewed at this visit  Medical Decision Making:    At this point the patient is doing very well.  The patient is pleased with postsurgical outcome.  With the resolution of pain, I believe that it is adequate to see the patient back in 8 weeks.      The patient has been educated on reasons that would necessitate a referral back to us in a more urgent manner.  The patient understands of his instructions and limitations for the best post-operative success.    It has been a pleasure providing neurosurgical care.    There are no diagnoses linked to this encounter.  No Follow-up on file.

## 2020-06-24 ENCOUNTER — TELEPHONE (OUTPATIENT)
Dept: NEUROSURGERY | Facility: CLINIC | Age: 50
End: 2020-06-24

## 2020-06-24 NOTE — TELEPHONE ENCOUNTER
Provider:  Juan Alberto  Caller: Pricilla  Time of call:   1:11p  Phone #:  [phone]  Surgery:  ACDF C5-6  Surgery Date:  5/9/18  Last visit:   5/31/18  Next visit: PRN         Reason for call:       Pt left a message stating she is experiencing the same issues she had prior to her ACDF. She is having a lot of neck & arm pain along with severe headaches. She is requesting a call back for information on what to do next.

## 2020-07-06 ENCOUNTER — OFFICE VISIT (OUTPATIENT)
Dept: NEUROSURGERY | Facility: CLINIC | Age: 50
End: 2020-07-06

## 2020-07-06 VITALS
TEMPERATURE: 97.3 F | SYSTOLIC BLOOD PRESSURE: 138 MMHG | DIASTOLIC BLOOD PRESSURE: 60 MMHG | HEIGHT: 65 IN | WEIGHT: 162 LBS | BODY MASS INDEX: 26.99 KG/M2

## 2020-07-06 DIAGNOSIS — M54.2 NECK PAIN: ICD-10-CM

## 2020-07-06 DIAGNOSIS — G54.9 MYELORADICULOPATHY: ICD-10-CM

## 2020-07-06 DIAGNOSIS — M50.20 CERVICAL DISC HERNIATION: Primary | ICD-10-CM

## 2020-07-06 PROCEDURE — 99213 OFFICE O/P EST LOW 20 MIN: CPT | Performed by: PHYSICIAN ASSISTANT

## 2020-07-06 RX ORDER — METHYLPREDNISOLONE 4 MG/1
TABLET ORAL
Qty: 21 TABLET | Refills: 0 | Status: SHIPPED | OUTPATIENT
Start: 2020-07-06 | End: 2021-07-21

## 2020-07-06 RX ORDER — TIZANIDINE 4 MG/1
4 TABLET ORAL NIGHTLY
COMMUNITY
End: 2022-03-09

## 2020-07-06 NOTE — PROGRESS NOTES
NAME: LEOBARDO MANNING   DOS: 2020  : 1970  PCP: Stephanie Pandya PA    Chief Complaint:  Follow-up      History of Present Illness: Ms. Manning is a 49 y.o. female who is seen today status post a C5-6 ACDF that was performed on 2018.  She has had resolution of symptoms, until the last 2 months.  She notes that she has had gradual weakness with in the right arm and most notably a decrease in  strength.  She also notes neck pain that radiates along the medial aspect of her arm and terminates into her pinky finger.  She states that laying flat helps alleviate her symptoms.  Patient notes that she has been doing increased computer work and writing and is unsure if this has aggravated her symptoms.  Patient has been taking ibuprofen at night.  She is taking no other medications or has not performed physical therapy.  She is seen today in follow-up.    Past Medical History:   Diagnosis Date   • Arthritis     neck   • Depression    • Diabetes mellitus (CMS/[facility])     Controlled with medications-po and insulin; checks sugars at home daily-running 100-120   • Gout    • Headache    • History of kidney stones     numerous-most passed and 4 extractions    • History of stomach ulcers    • Hyperlipidemia     Controlled with medications   • Hypertension    • Left arm pain     due to neck    • Neck pain    • PONV (postoperative nausea and vomiting)     after hysterectomy    • Wears glasses        Past Surgical History:   Procedure Laterality Date   • ANTERIOR CERVICAL DISCECTOMY W/ FUSION Left 2018    Procedure: CERVICAL DISCECTOMY ANTERIOR WITH FUSION C5-6 LEFT;  Surgeon: Dawood Avendano MD;  Location: [facility];  Service: Neurosurgery   • CHOLECYSTECTOMY     • COLONOSCOPY     • ENDOSCOPY     • HYSTERECTOMY  2016    with bso    • KIDNEY STONE SURGERY      x4   • NISSEN FUNDOPLICATION               Review of Systems   HENT: Positive for sinus pressure.    Musculoskeletal: Positive for  myalgias, neck pain and neck stiffness.   Neurological: Positive for weakness, light-headedness, numbness and headaches.   All other systems reviewed and are negative.       Medications:    Current Outpatient Medications:   •  Insulin Glargine (LANTUS SC), Inject 40 Units under the skin into the appropriate area as directed Every Morning., Disp: , Rfl:   •  insulin lispro protamine-insulin lispro (humaLOG 75-25) (75-25) 100 UNIT/ML suspension injection, Inject 30 Units under the skin 2 (Two) Times a Day With Meals., Disp: , Rfl:   •  tiZANidine (ZANAFLEX) 4 MG tablet, Take 4 mg by mouth Every Night. As needed, Disp: , Rfl:   •  glipiZIDE (GLUCOTROL XL) 10 MG 24 hr tablet, Take 10 mg by mouth 2 (Two) Times a Day., Disp: , Rfl:   •  hydrochlorothiazide (HYDRODIURIL) 25 MG tablet, Take 25 mg by mouth Daily. Only takes daily not BID like bottle states, Disp: , Rfl:   •  insulin detemir (LEVEMIR) 100 UNIT/ML injection, Inject 55 Units under the skin Every Night., Disp: , Rfl:   •  insulin lispro (HUMALOG) 100 UNIT/ML injection, Humalog U-100 Insulin 100 unit/mL subcutaneous solution  Daily, Disp: , Rfl:   •  methylPREDNISolone (MEDROL, ANA PAULA,) 4 MG tablet, Take as directed on package instructions., Disp: 21 tablet, Rfl: 0  •  Turmeric 500 MG tablet, Take 1 tablet by mouth Daily., Disp: , Rfl:     Allergies:  Allergies   Allergen Reactions   • Amoxicillin Unknown (See Comments)   • Levofloxacin Unknown (See Comments)   • Chloraprep One Step [Chlorhexidine Gluconate] Rash       Social History     Tobacco Use   • Smoking status: Never Smoker   • Smokeless tobacco: Never Used   Substance Use Topics   • Alcohol use: No   • Drug use: No       Family History   Problem Relation Age of Onset   • Cancer Father         Colon Ca   • Diabetes Father    • Aneurysm Paternal Grandmother         Stomach Az       Review of Imaging:  No new imaging to review    Vitals:    07/06/20 1407   BP: 138/60   Temp: 97.3 °F (36.3 °C)     Body mass  index is 26.96 kg/m².    Physical Exam   Constitutional: She is oriented to person, place, and time.   Neurological: She is oriented to person, place, and time. Gait normal.   Reflex Scores:       Tricep reflexes are 3+ on the right side and 3+ on the left side.       Bicep reflexes are 3+ on the right side and 3+ on the left side.       Patellar reflexes are 3+ on the right side and 3+ on the left side.       Achilles reflexes are 3+ on the right side and 3+ on the left side.    Neurologic Exam     Mental Status   Oriented to person, place, and time.     Motor Exam   Muscle bulk: normal  Overall muscle tone: normal    Strength   Right deltoid: 5/5  Left deltoid: 5/5  Right biceps: 5/5  Left biceps: 5/5  Right triceps: 5/5  Left triceps: 5/5  Right iliopsoas: 5/5  Left iliopsoas: 5/5  Right quadriceps: 5/5  Left quadriceps: 5/5  Right hamstrin/5  Left hamstrin/5  Right anterior tibial: 5/5  Left anterior tibial: 5/5  Right gastroc: 5/5  Left gastroc: 5/5    Sensory Exam   Light touch normal.     Gait, Coordination, and Reflexes     Gait  Gait: normal    Reflexes   Right biceps: 3+  Left biceps: 3+  Right triceps: 3+  Left triceps: 3+  Right patellar: 3+  Left patellar: 3+  Right achilles: 3+  Left achilles: 3+  Right Prieto: absent  Left Prieto: absent  Right ankle clonus: absent  Left ankle clonus: absent      Diagnoses/Plan:    Ms. Senior is a 49 y.o. female is seen today status post a C5-6 ACDF that was performed in May 2018.  She has done well postoperatively, but has had worsening of symptoms for the last 2 months.  Due to the increase in symptoms, we will prescribe patient a Medrol Dosepak to help provide relief.  At this time, we will also refer patient to physical therapy.  We will have patient follow-up in 4-6 weeks, with an MRI and flexion-extension x-rays of the cervical spine if symptoms are not not yet improved.  Signs and symptoms reviewed with patient that would warrant a call to the  clinic/911.  Patient was understanding of this plan and willing to proceed.      Patient's Body mass index is 26.96 kg/m². BMI is above normal parameters. Recommendations include: educational material.       Purvi Pozo PA-C

## 2020-07-07 ENCOUNTER — DOCUMENTATION (OUTPATIENT)
Dept: NEUROSURGERY | Facility: CLINIC | Age: 50
End: 2020-07-07

## 2020-07-07 NOTE — PATIENT INSTRUCTIONS

## 2020-08-07 ENCOUNTER — TELEPHONE (OUTPATIENT)
Dept: NEUROSURGERY | Facility: CLINIC | Age: 50
End: 2020-08-07

## 2020-08-07 NOTE — TELEPHONE ENCOUNTER
PT CALLED STATING SHE WAS SEEN IN OFFICE ON 7/6/20 AND WAS TOLD TO HAVE A FOLLOW-UP APPT IN 4-6 WEEKS WITH A MRI AND FLEXION EXTENSION XRAY. PT STATED SHE HAS NOT BE CALLED TO SCHEDULE.    PLEASE CALL PT [phone] WITH SCHEDULED APPT.

## 2020-09-24 ENCOUNTER — TELEPHONE (OUTPATIENT)
Dept: NEUROSURGERY | Facility: CLINIC | Age: 50
End: 2020-09-24

## 2020-09-24 NOTE — TELEPHONE ENCOUNTER
This patient is a s/p ACDF Juan Alberto patient that at last visit was having some issues. Juan Alberto is out next week, can we have this appointment changed?

## 2020-09-25 ENCOUNTER — OFFICE VISIT (OUTPATIENT)
Dept: NEUROSURGERY | Facility: CLINIC | Age: 50
End: 2020-09-25

## 2020-09-25 VITALS — BODY MASS INDEX: 27.76 KG/M2 | TEMPERATURE: 97.3 F | WEIGHT: 166.6 LBS | HEIGHT: 65 IN | RESPIRATION RATE: 15 BRPM

## 2020-09-25 DIAGNOSIS — M50.20 CERVICAL DISC HERNIATION: Primary | ICD-10-CM

## 2020-09-25 DIAGNOSIS — M54.2 NECK PAIN: ICD-10-CM

## 2020-09-25 PROCEDURE — 99213 OFFICE O/P EST LOW 20 MIN: CPT | Performed by: PHYSICIAN ASSISTANT

## 2020-09-25 RX ORDER — MELOXICAM 7.5 MG/1
7.5 TABLET ORAL DAILY
Qty: 30 TABLET | Refills: 2 | Status: SHIPPED | OUTPATIENT
Start: 2020-09-25 | End: 2021-01-06 | Stop reason: SDUPTHER

## 2020-09-25 NOTE — PROGRESS NOTES
NAME: LEOBARDO MANNING   DOS: 2020  : 1970  PCP: Stephanie Pandya PA    Chief Complaint:  Neck Pain      History of Present Illness: Ms. Manning is a 49 y.o. female who is seen today status post C5-6 ACDF that was performed in May 2018.  Patient had resolution of symptoms until the last several months.  She notes significant pain located at her neck and bilateral shoulders.  Also, has been experiencing intermittent numbness (R>L) radiating along the medial aspect of her arm and terminating into her pinky finger.  Does note the symptoms are less severe compared to preoperatively.  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  She does note able to alleviate the symptoms when she lays flat.  She has been on ibuprofen and Zanaflex.  She has completed physical therapy, which does help alleviate her symptoms for a short period of time.  She is seen today in follow-up.      Past Medical History:   Diagnosis Date   • Arthritis     neck   • Depression    • Diabetes mellitus (CMS/[facility])     Controlled with medications-po and insulin; checks sugars at home daily-running 100-120   • Gout    • Headache    • History of kidney stones     numerous-most passed and 4 extractions    • History of stomach ulcers    • Hyperlipidemia     Controlled with medications   • Hypertension    • Left arm pain     due to neck    • Neck pain    • PONV (postoperative nausea and vomiting)     after hysterectomy    • Wears glasses        Past Surgical History:   Procedure Laterality Date   • ANTERIOR CERVICAL DISCECTOMY W/ FUSION Left 2018    Procedure: CERVICAL DISCECTOMY ANTERIOR WITH FUSION C5-6 LEFT;  Surgeon: Dawood Avendano MD;  Location: [facility];  Service: Neurosurgery   • CHOLECYSTECTOMY     • COLONOSCOPY     • ENDOSCOPY     • HYSTERECTOMY  2016    with bso    • KIDNEY STONE SURGERY      x4   • NISSEN FUNDOPLICATION               Review of  Systems   Constitutional: Negative for activity change, appetite change, chills, diaphoresis, fatigue, fever and unexpected weight change.   HENT: Negative for congestion, dental problem, drooling, ear discharge, ear pain, facial swelling, hearing loss, mouth sores, nosebleeds, postnasal drip, rhinorrhea, sinus pressure, sneezing, sore throat, tinnitus, trouble swallowing and voice change.    Eyes: Negative for photophobia, pain, discharge, redness, itching and visual disturbance.   Respiratory: Negative for apnea, cough, choking, chest tightness, shortness of breath, wheezing and stridor.    Cardiovascular: Negative for chest pain, palpitations and leg swelling.   Gastrointestinal: Negative for abdominal distention, abdominal pain, anal bleeding, blood in stool, constipation, diarrhea, nausea, rectal pain and vomiting.   Endocrine: Negative for cold intolerance, heat intolerance, polydipsia, polyphagia and polyuria.   Genitourinary: Negative for decreased urine volume, difficulty urinating, dysuria, enuresis, flank pain, frequency, genital sores, hematuria and urgency.   Musculoskeletal: Positive for neck pain. Negative for arthralgias, back pain, gait problem, joint swelling, myalgias and neck stiffness.   Skin: Negative for color change, pallor, rash and wound.   Allergic/Immunologic: Negative for environmental allergies, food allergies and immunocompromised state.   Neurological: Negative for dizziness, tremors, seizures, syncope, facial asymmetry, speech difficulty, weakness, light-headedness, numbness and headaches.   Hematological: Negative for adenopathy. Does not bruise/bleed easily.   Psychiatric/Behavioral: Negative for agitation, behavioral problems, confusion, decreased concentration, dysphoric mood, hallucinations, self-injury, sleep disturbance and suicidal ideas. The patient is not nervous/anxious and is not hyperactive.    All other systems reviewed and are negative.       Medications:    Current  Outpatient Medications:   •  glipiZIDE (GLUCOTROL XL) 10 MG 24 hr tablet, Take 10 mg by mouth 2 (Two) Times a Day., Disp: , Rfl:   •  hydrochlorothiazide (HYDRODIURIL) 25 MG tablet, Take 25 mg by mouth Daily. Only takes daily not BID like bottle states, Disp: , Rfl:   •  insulin detemir (LEVEMIR) 100 UNIT/ML injection, Inject 55 Units under the skin Every Night., Disp: , Rfl:   •  Insulin Glargine (LANTUS SC), Inject 40 Units under the skin into the appropriate area as directed Every Morning., Disp: , Rfl:   •  insulin lispro (HUMALOG) 100 UNIT/ML injection, Humalog U-100 Insulin 100 unit/mL subcutaneous solution  Daily, Disp: , Rfl:   •  insulin lispro protamine-insulin lispro (humaLOG 75-25) (75-25) 100 UNIT/ML suspension injection, Inject 30 Units under the skin 2 (Two) Times a Day With Meals., Disp: , Rfl:   •  meloxicam (Mobic) 7.5 MG tablet, Take 1 tablet by mouth Daily., Disp: 30 tablet, Rfl: 2  •  methylPREDNISolone (MEDROL, ANA PAULA,) 4 MG tablet, Take as directed on package instructions., Disp: 21 tablet, Rfl: 0  •  tiZANidine (ZANAFLEX) 4 MG tablet, Take 4 mg by mouth Every Night. As needed, Disp: , Rfl:   •  Turmeric 500 MG tablet, Take 1 tablet by mouth Daily., Disp: , Rfl:     Allergies:  Allergies   Allergen Reactions   • Amoxicillin Unknown (See Comments)   • Levofloxacin Unknown (See Comments)   • Chloraprep One Step [Chlorhexidine Gluconate] Rash       Social History     Tobacco Use   • Smoking status: Never Smoker   • Smokeless tobacco: Never Used   Substance Use Topics   • Alcohol use: No   • Drug use: No       Family History   Problem Relation Age of Onset   • Cancer Father         Colon Ca   • Diabetes Father    • Aneurysm Paternal Grandmother         Stomach Az       Review of Imaging:  No new imaging to review     Vitals:    09/25/20 1155   Resp: 15   Temp: 97.3 °F (36.3 °C)     Body mass index is 27.72 kg/m².    Physical Exam  Constitutional:       Appearance: She is normal weight.   Neck:       Musculoskeletal: Normal range of motion. Muscular tenderness present.   Pulmonary:      Effort: Pulmonary effort is normal. No respiratory distress.   Musculoskeletal: Normal range of motion.         General: No deformity.   Skin:     General: Skin is warm and dry.   Neurological:      Mental Status: She is alert and oriented to person, place, and time.      Gait: Gait is intact.      Deep Tendon Reflexes: Strength normal.      Reflex Scores:       Tricep reflexes are 3+ on the right side and 3+ on the left side.       Bicep reflexes are 3+ on the right side and 3+ on the left side.       Brachioradialis reflexes are 3+ on the right side and 3+ on the left side.      Neurologic Exam     Mental Status   Oriented to person, place, and time.     Motor Exam   Muscle bulk: normal  Overall muscle tone: normal    Strength   Strength 5/5 throughout.     Sensory Exam   Light touch normal.     Gait, Coordination, and Reflexes     Gait  Gait: normal    Reflexes   Right brachioradialis: 3+  Left brachioradialis: 3+  Right biceps: 3+  Left biceps: 3+  Right triceps: 3+  Left triceps: 3+  Right Prieto: absent  Left Prieto: absent  Right ankle clonus: absent  Left ankle clonus: absent      Diagnoses/Plan:    Ms. Senior is a 49 y.o. female who is seen today status post C5-6 ACDF in May 2018, with recurrence of neck pain and intermittent radiculopathy.  She has had an MRI and cervical flexion and extension xray performed, however, we have yet to receive the studies.  Therefore, we will have patient follow-up via telephone visit once were able to review the studies.  In addition, we have placed patient on Mobic to take on a daily basis and referred her to pain management for possible injections.  Further treatment recommendations pending the results of the MRI.  Patient also discuss possible medical MCC, however, it was stated to patient that we will have to try all avenues of pain management prior.  Patient was understanding  this plan and willing to proceed.  Signs and symptoms were reviewed with patient that would warrant a call to the clinic/911.      Patient's Body mass index is 27.72 kg/m². BMI is above normal parameters. Recommendations include: educational material.          Purvi Pozo PA-C

## 2020-10-13 ENCOUNTER — TELEPHONE (OUTPATIENT)
Dept: ENDOCRINOLOGY | Facility: CLINIC | Age: 50
End: 2020-10-13

## 2020-10-14 ENCOUNTER — TELEPHONE (OUTPATIENT)
Dept: NEUROSURGERY | Facility: CLINIC | Age: 50
End: 2020-10-14

## 2020-10-14 NOTE — TELEPHONE ENCOUNTER
I attempted to call patient, no answer. If patient calls, please set up a time for a telephone visit. Thanks

## 2020-10-14 NOTE — TELEPHONE ENCOUNTER
----- Message from Bertha Ospina sent at 10/12/2020  9:38 AM EDT -----  Regarding: MRI Disc  Pt called inquiring if her MRI disc had been received from [facility].  Please call her to discuss results (p: [phone]).  If disc has not been received please let me know.  Thank you.

## 2020-10-21 ENCOUNTER — OFFICE VISIT (OUTPATIENT)
Dept: NEUROSURGERY | Facility: CLINIC | Age: 50
End: 2020-10-21

## 2020-10-21 VITALS — WEIGHT: 166 LBS | TEMPERATURE: 97.6 F | BODY MASS INDEX: 27.66 KG/M2 | HEIGHT: 65 IN

## 2020-10-21 DIAGNOSIS — M50.30 DDD (DEGENERATIVE DISC DISEASE), CERVICAL: ICD-10-CM

## 2020-10-21 DIAGNOSIS — M54.2 NECK PAIN: Primary | ICD-10-CM

## 2020-10-21 DIAGNOSIS — R20.0 ARM NUMBNESS: ICD-10-CM

## 2020-10-21 PROCEDURE — 99213 OFFICE O/P EST LOW 20 MIN: CPT | Performed by: PHYSICIAN ASSISTANT

## 2020-10-21 RX ORDER — IBUPROFEN 800 MG/1
800 TABLET ORAL NIGHTLY
COMMUNITY
End: 2021-01-06

## 2020-10-21 NOTE — PATIENT INSTRUCTIONS

## 2020-10-21 NOTE — PROGRESS NOTES
NAME: LEOBARDO MANNING   DOS: 10/21/2020  : 1970  PCP: Stephanie Pandya PA  Type of Service: Appointment via telemedicine  You have chosen to receive care through a telehealth visit.  Do you consent to use a video/audio connection for your medical care today? Yes   Mode of Transmission: Telemedicine via 2 way interactive audio telecommunication    Chief Complaint:  Neck Pain, intermittent bilateral arm numbness    History of Present Illness: Ms. Manning is a 49 y.o. female who is seen in telephone visit status post C5-C6 ACDF that was performed in May 2018.  Initially, patient had resolution of symptoms for several months.  Recently, patient has started to have significant pain in her neck and bilateral shoulders.  In addition, she notes intermittent numbness (R>L) radiating along the medial aspect of her arm and terminating into her pinky finger.  She does note that the symptoms are less severe compared to preoperatively.  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  In addition, she notes when she lays flat she is able to alleviate the symptoms.  She has completed physical therapy, which helps alleviate her symptoms for a short period of time.  She has been taking Mobic and Zanaflex for symptom relief.  She is seen today in follow-up.      Past Medical History:   Diagnosis Date   • Arthritis     neck   • Depression    • Diabetes mellitus (CMS/HCC)     Controlled with medications-po and insulin; checks sugars at home daily-running 100-120   • Gout    • Headache    • History of kidney stones     numerous-most passed and 4 extractions    • History of stomach ulcers    • Hyperlipidemia     Controlled with medications   • Hypertension    • Left arm pain     due to neck    • Neck pain    • PONV (postoperative nausea and vomiting)     after hysterectomy    • Wears glasses        Past Surgical History:   Procedure Laterality Date   • ANTERIOR CERVICAL  DISCECTOMY W/ FUSION Left 5/9/2018    Procedure: CERVICAL DISCECTOMY ANTERIOR WITH FUSION C5-6 LEFT;  Surgeon: Dawood Avendano MD;  Location: [facility];  Service: Neurosurgery   • CHOLECYSTECTOMY     • COLONOSCOPY  2000   • ENDOSCOPY     • HYSTERECTOMY  06/2016    with bso    • KIDNEY STONE SURGERY      x4   • NISSEN FUNDOPLICATION               Review of Systems     Medications:    Current Outpatient Medications:   •  ibuprofen (ADVIL,MOTRIN) 800 MG tablet, Take 800 mg by mouth Every Night., Disp: , Rfl:   •  glipiZIDE (GLUCOTROL XL) 10 MG 24 hr tablet, Take 10 mg by mouth 2 (Two) Times a Day., Disp: , Rfl:   •  hydrochlorothiazide (HYDRODIURIL) 25 MG tablet, Take 25 mg by mouth Daily. Only takes daily not BID like bottle states, Disp: , Rfl:   •  insulin detemir (LEVEMIR) 100 UNIT/ML injection, Inject 55 Units under the skin Every Night., Disp: , Rfl:   •  Insulin Glargine (LANTUS SC), Inject 40 Units under the skin into the appropriate area as directed Every Morning., Disp: , Rfl:   •  insulin lispro (HUMALOG) 100 UNIT/ML injection, Humalog U-100 Insulin 100 unit/mL subcutaneous solution  Daily, Disp: , Rfl:   •  insulin lispro protamine-insulin lispro (humaLOG 75-25) (75-25) 100 UNIT/ML suspension injection, Inject 30 Units under the skin 2 (Two) Times a Day With Meals., Disp: , Rfl:   •  meloxicam (Mobic) 7.5 MG tablet, Take 1 tablet by mouth Daily., Disp: 30 tablet, Rfl: 2  •  methylPREDNISolone (MEDROL, ANA PAULA,) 4 MG tablet, Take as directed on package instructions., Disp: 21 tablet, Rfl: 0  •  tiZANidine (ZANAFLEX) 4 MG tablet, Take 4 mg by mouth Every Night. As needed, Disp: , Rfl:   •  Turmeric 500 MG tablet, Take 1 tablet by mouth Daily., Disp: , Rfl:     Allergies:  Allergies   Allergen Reactions   • Amoxicillin Unknown (See Comments)   • Levofloxacin Unknown (See Comments)   • Chloraprep One Step [Chlorhexidine Gluconate] Rash       Social History     Tobacco Use   • Smoking status: Never Smoker   •  Smokeless tobacco: Never Used   Substance Use Topics   • Alcohol use: No   • Drug use: No       Family History   Problem Relation Age of Onset   • Cancer Father         Colon Ca   • Diabetes Father    • Aneurysm Paternal Grandmother         Stomach Az       Review of Imaging:  MRI of the cervical spine was reviewed along with its corresponding radiology report.  Study demonstrates multilevel degenerative disc disease with hypertrophic facet disease.  There is also evidence of postsurgical changes at the C5-6 level.    Vitals:    10/21/20 1318   Temp: 97.6 °F (36.4 °C)     Body mass index is 27.62 kg/m².    Physical Exam  Neurologic Exam  This visit was done via televisit due to the COVID-19 epidemic.  Patient states that she has equal strength bilaterally.  Speech is fluent.  Alert and orientated x3.  Diagnoses/Plan:    Ms. Senior is a 49 y.o. female seen today status post C5-6 ACDF in May 2018, with recurrence of neck pain and intermittent bilateral radiculopathy.  After reviewing the patient's recent MRI, there is evidence of multilevel degenerative disc disease.  Therefore, at this time we continue to encourage patient to pursue conservative management.  At last visit, patient was referred to pain management, however, she has not yet heard about an appointment.  Therefore, we will reinitiate this referral.  Patient will follow-up after she is seen by pain management.  At that time, due to the patient's radicular symptoms will have an EMG/NCT performed prior to the visit and flexion-extension x-rays of the cervical spine due to motion noted on MRI and prior surgery.  Patient continues to remain in the interested in medical jail, however, it was stressed to patient that she needs to attempt all conservative management prior to Dr. Avendano considering this option.  Patient was educated in signs and symptoms that would warrant a call to our clinic/911.      Patient's Body mass index is 27.62 kg/m². BMI is above  normal parameters. Recommendations include: exercise counseling.         Purvi Pozo PA-C    Televisit Start Time: 1320  Televisit End Time: 1340

## 2020-11-25 PROBLEM — M47.812 CERVICAL SPONDYLOSIS WITHOUT MYELOPATHY: Status: ACTIVE | Noted: 2020-11-25

## 2020-11-25 PROBLEM — Z98.1 HX OF FUSION OF CERVICAL SPINE: Status: ACTIVE | Noted: 2020-11-25

## 2020-11-25 PROBLEM — M48.02 CERVICAL SPINAL STENOSIS: Status: ACTIVE | Noted: 2020-11-25

## 2020-12-01 ENCOUNTER — OFFICE VISIT (OUTPATIENT)
Dept: PAIN MEDICINE | Facility: CLINIC | Age: 50
End: 2020-12-01

## 2020-12-01 VITALS
BODY MASS INDEX: 28.32 KG/M2 | WEIGHT: 170 LBS | TEMPERATURE: 97 F | DIASTOLIC BLOOD PRESSURE: 70 MMHG | HEIGHT: 65 IN | RESPIRATION RATE: 12 BRPM | SYSTOLIC BLOOD PRESSURE: 115 MMHG | HEART RATE: 65 BPM | OXYGEN SATURATION: 98 %

## 2020-12-01 DIAGNOSIS — E10.9 INSULIN DEPENDENT DIABETES MELLITUS TYPE IA (HCC): ICD-10-CM

## 2020-12-01 DIAGNOSIS — F41.1 GAD (GENERALIZED ANXIETY DISORDER): ICD-10-CM

## 2020-12-01 DIAGNOSIS — M50.20 CERVICAL DISC DISPLACEMENT: ICD-10-CM

## 2020-12-01 DIAGNOSIS — M79.18 MYOFASCIAL PAIN: ICD-10-CM

## 2020-12-01 DIAGNOSIS — M50.20 CERVICAL DISC DISPLACEMENT: Primary | ICD-10-CM

## 2020-12-01 DIAGNOSIS — G47.01 INSOMNIA DUE TO MEDICAL CONDITION: ICD-10-CM

## 2020-12-01 DIAGNOSIS — Z98.1 HX OF FUSION OF CERVICAL SPINE: ICD-10-CM

## 2020-12-01 DIAGNOSIS — M47.812 CERVICAL SPONDYLOSIS WITHOUT MYELOPATHY: ICD-10-CM

## 2020-12-01 DIAGNOSIS — M48.02 CERVICAL SPINAL STENOSIS: ICD-10-CM

## 2020-12-01 PROCEDURE — 99204 OFFICE O/P NEW MOD 45 MIN: CPT | Performed by: ANESTHESIOLOGY

## 2020-12-01 RX ORDER — INSULIN GLARGINE 300 U/ML
INJECTION, SOLUTION SUBCUTANEOUS
COMMUNITY
Start: 2020-10-21 | End: 2021-03-02 | Stop reason: SDUPTHER

## 2020-12-01 RX ORDER — DICYCLOMINE HYDROCHLORIDE 10 MG/1
CAPSULE ORAL
COMMUNITY
Start: 2020-10-21 | End: 2022-03-09

## 2020-12-01 RX ORDER — FLASH GLUCOSE SENSOR
KIT MISCELLANEOUS
COMMUNITY
Start: 2020-10-21 | End: 2021-02-05 | Stop reason: SDUPTHER

## 2020-12-01 RX ORDER — NORTRIPTYLINE HYDROCHLORIDE 10 MG/1
10 CAPSULE ORAL NIGHTLY PRN
Qty: 60 CAPSULE | Refills: 1 | Status: SHIPPED | OUTPATIENT
Start: 2020-12-01 | End: 2022-03-09

## 2020-12-07 ENCOUNTER — HOSPITAL ENCOUNTER (OUTPATIENT)
Dept: GENERAL RADIOLOGY | Facility: HOSPITAL | Age: 50
Discharge: HOME | End: 2020-12-07
Admitting: PHYSICIAN ASSISTANT

## 2020-12-07 DIAGNOSIS — M50.20 CERVICAL DISC HERNIATION: ICD-10-CM

## 2020-12-07 DIAGNOSIS — G54.9 MYELORADICULOPATHY: ICD-10-CM

## 2020-12-07 DIAGNOSIS — M54.2 NECK PAIN: ICD-10-CM

## 2020-12-07 PROCEDURE — 72052 X-RAY EXAM NECK SPINE 6/>VWS: CPT

## 2020-12-15 DIAGNOSIS — M50.20 CERVICAL DISC DISPLACEMENT: Primary | ICD-10-CM

## 2020-12-16 ENCOUNTER — TRANSCRIBE ORDERS (OUTPATIENT)
Dept: ADMINISTRATIVE | Facility: HOSPITAL | Age: 50
End: 2020-12-16

## 2020-12-16 DIAGNOSIS — Z01.818 OTHER SPECIFIED PRE-OPERATIVE EXAMINATION: Primary | ICD-10-CM

## 2020-12-23 ENCOUNTER — OUTSIDE FACILITY SERVICE (OUTPATIENT)
Dept: PAIN MEDICINE | Facility: CLINIC | Age: 50
End: 2020-12-23

## 2020-12-23 PROCEDURE — 62321 NJX INTERLAMINAR CRV/THRC: CPT | Performed by: ANESTHESIOLOGY

## 2020-12-23 PROCEDURE — 99152 MOD SED SAME PHYS/QHP 5/>YRS: CPT | Performed by: ANESTHESIOLOGY

## 2020-12-24 ENCOUNTER — TELEPHONE (OUTPATIENT)
Dept: PAIN MEDICINE | Facility: CLINIC | Age: 50
End: 2020-12-24

## 2020-12-24 NOTE — TELEPHONE ENCOUNTER
cervical epidural steroid injection at C7-T1 by right paramedian interlaminar approach. 12/23/2020.    Spoke with patient, she reports she is doing well. No questions/concerns.  Patient aware of follow-up appointment.   Card placed in outgoing mail.

## 2021-01-06 ENCOUNTER — TELEPHONE (OUTPATIENT)
Dept: NEUROSURGERY | Facility: CLINIC | Age: 51
End: 2021-01-06

## 2021-01-06 ENCOUNTER — OFFICE VISIT (OUTPATIENT)
Dept: NEUROSURGERY | Facility: CLINIC | Age: 51
End: 2021-01-06

## 2021-01-06 VITALS
HEIGHT: 65 IN | DIASTOLIC BLOOD PRESSURE: 70 MMHG | TEMPERATURE: 96.8 F | WEIGHT: 178 LBS | BODY MASS INDEX: 29.66 KG/M2 | SYSTOLIC BLOOD PRESSURE: 120 MMHG

## 2021-01-06 DIAGNOSIS — Z98.1 HX OF FUSION OF CERVICAL SPINE: Primary | ICD-10-CM

## 2021-01-06 DIAGNOSIS — R20.0 ARM NUMBNESS: ICD-10-CM

## 2021-01-06 DIAGNOSIS — M54.2 NECK PAIN: ICD-10-CM

## 2021-01-06 PROCEDURE — 99213 OFFICE O/P EST LOW 20 MIN: CPT | Performed by: PHYSICIAN ASSISTANT

## 2021-01-06 RX ORDER — INSULIN LISPRO 100 [IU]/ML
INJECTION, SOLUTION INTRAVENOUS; SUBCUTANEOUS
COMMUNITY
Start: 2021-01-04 | End: 2021-07-21 | Stop reason: SDUPTHER

## 2021-01-06 RX ORDER — MELOXICAM 7.5 MG/1
7.5 TABLET ORAL DAILY
Qty: 30 TABLET | Refills: 2 | Status: SHIPPED | OUTPATIENT
Start: 2021-01-06 | End: 2022-03-09

## 2021-01-06 NOTE — PROGRESS NOTES
NAME: LEOBARDO MANNING   DOS: 2021  : 1970  PCP: Stephanie Pandya PA    Chief Complaint:  Follow-up and Neck Pain      History of Present Illness: Ms. Manning is a 50 y.o. female who is seen today status post C5-C6 ACDF that was performed in May 2018.  Patient had resolution of symptoms for several months.  Over the last several months, patient has started to have significant pain in her neck and bilateral shoulders.  She also has been experiencing intermittent numbness (R>L) that radiates along the medial aspect of her arm and terminating into her fourth and fifth digits.  Compared to her preoperative status, she does note the symptoms are much improved.  She states that with any activity that her neck pain does become significant that she has to stop and rest.  She also notes when she lays flat it helps alleviate her symptoms.  Patient has completed physical therapy that helped alleviate symptoms for a short period of time, however, had no lasting impact.  Patient underwent injections with Dr. Donnelly and noted no changes to symptoms.  Patient takes Zanaflex as needed.  Patient has tried amitriptyline without success.  Patient notes she has tried gabapentin several years ago.  She notes that she has not been taking an anti-inflammatory regularly.  She is seen today in evaluation with EMG and x-ray.      Past Medical History:   Diagnosis Date   • Arthritis     neck   • Depression    • Diabetes mellitus (CMS/[facility])     Controlled with medications-po and insulin; checks sugars at home daily-running 100-120   • Gout    • Headache    • History of kidney stones     numerous-most passed and 4 extractions    • History of stomach ulcers    • Hyperlipidemia     Controlled with medications   • Hypertension    • Left arm pain     due to neck    • Neck pain    • PONV (postoperative nausea and vomiting)     after hysterectomy    • Wears glasses        Past Surgical History:   Procedure Laterality Date   •  ANTERIOR CERVICAL DISCECTOMY W/ FUSION Left 5/9/2018    Procedure: CERVICAL DISCECTOMY ANTERIOR WITH FUSION C5-6 LEFT;  Surgeon: Dawood Avendano MD;  [facility]  Service: Neurosurgery   • CHOLECYSTECTOMY     • COLONOSCOPY  2000   • ENDOSCOPY     • HYSTERECTOMY  06/2016    with bso    • KIDNEY STONE SURGERY      x4   • NISSEN FUNDOPLICATION     • REDUCTION MAMMAPLASTY Bilateral 11/2019             Review of Systems   Musculoskeletal: Positive for neck pain and neck stiffness.   Neurological: Positive for numbness and headaches.            Medications:    Current Outpatient Medications:   •  Continuous Blood Gluc Sensor (FreeStyle Nahun 14 Day Sensor) misc, , Disp: , Rfl:   •  dicyclomine (BENTYL) 10 MG capsule, , Disp: , Rfl:   •  glipiZIDE (GLUCOTROL XL) 10 MG 24 hr tablet, Take 10 mg by mouth 2 (Two) Times a Day., Disp: , Rfl:   •  HumaLOG KwikPen 100 UNIT/ML solution pen-injector, , Disp: , Rfl:   •  hydrochlorothiazide (HYDRODIURIL) 25 MG tablet, Take 25 mg by mouth Daily. Only takes daily not BID like bottle states, Disp: , Rfl:   •  insulin detemir (LEVEMIR) 100 UNIT/ML injection, Inject 55 Units under the skin Every Night., Disp: , Rfl:   •  Insulin Glargine (LANTUS SC), Inject 40 Units under the skin into the appropriate area as directed Every Morning., Disp: , Rfl:   •  insulin lispro (HumaLOG) 100 UNIT/ML injection, 25 units daily, Disp: 10 mL, Rfl: 5  •  insulin lispro protamine-insulin lispro (humaLOG 75-25) (75-25) 100 UNIT/ML suspension injection, Inject 30 Units under the skin 2 (Two) Times a Day With Meals., Disp: , Rfl:   •  meloxicam (Mobic) 7.5 MG tablet, Take 1 tablet by mouth Daily., Disp: 30 tablet, Rfl: 2  •  methylPREDNISolone (MEDROL, ANA PAULA,) 4 MG tablet, Take as directed on package instructions., Disp: 21 tablet, Rfl: 0  •  nortriptyline (PAMELOR) 10 MG capsule, Take 1 capsule by mouth At Night As Needed (nocturnal pain and insomnia). 1-2 TABLETS AT BEDTIME, Disp: 60 capsule,  Rfl: 1  •  tiZANidine (ZANAFLEX) 4 MG tablet, Take 4 mg by mouth Every Night. As needed, Disp: , Rfl:   •  Toujeo SoloStar 300 UNIT/ML solution pen-injector injection, , Disp: , Rfl:   •  Turmeric 500 MG tablet, Take 1 tablet by mouth Daily., Disp: , Rfl:     Allergies:  Allergies   Allergen Reactions   • Amoxicillin Unknown (See Comments)   • Levofloxacin Unknown (See Comments)   • Chloraprep One Step [Chlorhexidine Gluconate] Rash       Social History     Tobacco Use   • Smoking status: Never Smoker   • Smokeless tobacco: Never Used   Substance Use Topics   • Alcohol use: No   • Drug use: No       Family History   Problem Relation Age of Onset   • Cancer Father         Colon Ca   • Diabetes Father    • Aneurysm Paternal Grandmother         Stomach Az       Review of Imaging:  EMG that was performed by Dr. Anderson on 12/7/2020 was reviewed.  Studies impression is mild right carpal tunnel syndrome, without any evidence of cervical radiculopathy in either arm.    X-ray that was performed on 12/7/2020 was reviewed.  Study demonstrates no inducible instability in flexion or extension views.  There is evidence of fused levels at C5 and C6.    Vitals:    01/06/21 1215   BP: 120/70   Temp: 96.8 °F (36 °C)     Body mass index is 29.62 kg/m².    Physical Exam  Constitutional:       Appearance: She is normal weight.   HENT:      Head: Normocephalic and atraumatic.   Skin:     General: Skin is warm and dry.   Neurological:      General: No focal deficit present.      Mental Status: She is alert and oriented to person, place, and time.      Gait: Gait is intact.      Deep Tendon Reflexes:      Reflex Scores:       Tricep reflexes are 2+ on the right side and 2+ on the left side.       Bicep reflexes are 2+ on the right side and 2+ on the left side.       Patellar reflexes are 2+ on the right side and 2+ on the left side.      Neurologic Exam     Mental Status   Oriented to person, place, and time.     Motor Exam   Muscle bulk:  normal  Overall muscle tone: normal  Right arm tone: normal  Left arm tone: normal  Right leg tone: normal  Left leg tone: normal    Strength   Right neck flexion: 5/5  Left neck flexion: 5/5  Right neck extension: 5/5  Left neck extension: 5/5  Right deltoid: 5/5  Left deltoid: 5/5  Right biceps: 5/5  Left biceps: 5/5  Right triceps: 5/5  Left triceps: 5/5  Right wrist flexion: 5/5  Left wrist flexion: 5/5  Right wrist extension: 5/5  Left wrist extension: 5/5  Right interossei: 5/5  Left interossei: 5/5Subjective tightness with neck ROM      Sensory Exam   Light touch normal.   Negative Wrist/Elbow Tinels Bilateral      Gait, Coordination, and Reflexes     Gait  Gait: normal    Reflexes   Right biceps: 2+  Left biceps: 2+  Right triceps: 2+  Left triceps: 2+  Right patellar: 2+  Left patellar: 2+  Right Prieto: absent  Left Prieto: absent  Right ankle clonus: absent  Left ankle clonus: absent      Diagnoses/Plan:    Ms. Senior is a 50 y.o. female is seen today status post C5-6 ACDF in May 2018, with recurrence of neck pain and intermittent bilateral radiculopathy.  After reviewing the studies, patient continues to have no great etiology of her symptoms.  Patient has not yet tried anti-inflammatories on a regular basis or Lyrica for her bilateral numbness, therefore, we will initiate these medications in hopes that offers the patient some pain relief.  We will have patient follow-up in approximately 2 weeks via telephone to see if these medications provide any relief to patient.  Patient states that she will try all conservative management, however, she continues to remain interested in medical [facility].  Therefore, we will attempt these medications, and then we will discuss with Dr. Avendano on considering medical [facility].  Patient was educated in signs and symptoms that would warrant a call to our clinic/911.      Patient's Body mass index is 29.62 kg/m². BMI is above normal parameters. Recommendations  include: educational material.           Purvi Pozo PA-C

## 2021-01-06 NOTE — PATIENT INSTRUCTIONS

## 2021-01-20 ENCOUNTER — OFFICE VISIT (OUTPATIENT)
Dept: NEUROSURGERY | Facility: CLINIC | Age: 51
End: 2021-01-20

## 2021-01-20 VITALS — BODY MASS INDEX: 29.66 KG/M2 | WEIGHT: 178 LBS | HEIGHT: 65 IN | TEMPERATURE: 97.9 F

## 2021-01-20 DIAGNOSIS — M47.812 CERVICAL SPONDYLOSIS WITHOUT MYELOPATHY: ICD-10-CM

## 2021-01-20 DIAGNOSIS — Z98.1 HX OF FUSION OF CERVICAL SPINE: Primary | ICD-10-CM

## 2021-01-20 PROCEDURE — 99441 PR PHYS/QHP TELEPHONE EVALUATION 5-10 MIN: CPT | Performed by: PHYSICIAN ASSISTANT

## 2021-01-20 NOTE — PROGRESS NOTES
NAME: LEOBARDO MANNING   DOS: 2021  : 1970  PCP: Stephanie Pandya PA  Type of Service: Appointment via telemedicine  You have chosen to receive care through a telehealth visit.  Do you consent to use a video/audio connection for your medical care today? Yes   Mode of Transmission: Telemedicine via 2 way interactive audio telecommunication    Televisit time: 10 minutes    Chief Complaint:  Follow-up      History of Present Illness: Ms. Manning is a 50 y.o. female  who is seen today status post C5-C6 ACDF that was performed in May 2018.  Patient had resolution of symptoms for several months.  Over the last several months, patient has started to have significant pain in her neck and bilateral shoulders.  She also has been experiencing intermittent numbness (R>L) that radiates along the medial aspect of her arm and terminating into her fourth and fifth digits.   Although, she does note that the numbness into her hands has improved with use of Lyrica.  She notes that she is unable to work more than 1 hour due to the significance of her symptoms.  Compared to her preoperative status, she does note the symptoms are much improved.  She states that with any activity that her neck pain does become significant that she has to stop and rest.  She also notes when she lays flat it helps alleviate her symptoms.  Patient has completed physical therapy that helped alleviate symptoms for a short period of time, however, had no lasting impact.  Patient underwent injections with Dr. Donnelly and noted no changes to symptoms.  Patient takes Zanaflex as needed.  Patient has also attempted amitriptyline, anti-inflammatories regularly, and gabapentin without success.   Patient is seen today in evaluation.      Past Medical History:   Diagnosis Date   • Arthritis     neck   • Depression    • Diabetes mellitus (CMS/[facility])     Controlled with medications-po and insulin; checks sugars at home daily-running 100-120   • Gout    •  Headache    • History of kidney stones     numerous-most passed and 4 extractions    • History of stomach ulcers 2000   • Hyperlipidemia     Controlled with medications   • Hypertension    • Left arm pain     due to neck    • Neck pain    • PONV (postoperative nausea and vomiting)     after hysterectomy    • Wears glasses        Past Surgical History:   Procedure Laterality Date   • ANTERIOR CERVICAL DISCECTOMY W/ FUSION Left 5/9/2018    Procedure: CERVICAL DISCECTOMY ANTERIOR WITH FUSION C5-6 LEFT;  Surgeon: Dawood Avendano MD;  Location: [facility];  Service: Neurosurgery   • CHOLECYSTECTOMY     • COLONOSCOPY  2000   • ENDOSCOPY     • HYSTERECTOMY  06/2016    with bso    • KIDNEY STONE SURGERY      x4   • NISSEN FUNDOPLICATION     • REDUCTION MAMMAPLASTY Bilateral 11/2019             Review of Systems   Musculoskeletal: Positive for neck pain and neck stiffness.   All other systems reviewed and are negative.           Medications:    Current Outpatient Medications:   •  Continuous Blood Gluc Sensor (FreeStyle Nahun 14 Day Sensor) misc, , Disp: , Rfl:   •  dicyclomine (BENTYL) 10 MG capsule, , Disp: , Rfl:   •  glipiZIDE (GLUCOTROL XL) 10 MG 24 hr tablet, Take 10 mg by mouth 2 (Two) Times a Day., Disp: , Rfl:   •  HumaLOG KwikPen 100 UNIT/ML solution pen-injector, , Disp: , Rfl:   •  hydrochlorothiazide (HYDRODIURIL) 25 MG tablet, Take 25 mg by mouth Daily. Only takes daily not BID like bottle states, Disp: , Rfl:   •  insulin detemir (LEVEMIR) 100 UNIT/ML injection, Inject 55 Units under the skin Every Night., Disp: , Rfl:   •  Insulin Glargine (LANTUS SC), Inject 40 Units under the skin into the appropriate area as directed Every Morning., Disp: , Rfl:   •  insulin lispro (HumaLOG) 100 UNIT/ML injection, 25 units daily, Disp: 10 mL, Rfl: 5  •  insulin lispro protamine-insulin lispro (humaLOG 75-25) (75-25) 100 UNIT/ML suspension injection, Inject 30 Units under the skin 2 (Two) Times a Day With Meals., Disp: ,  Rfl:   •  meloxicam (Mobic) 7.5 MG tablet, Take 1 tablet by mouth Daily., Disp: 30 tablet, Rfl: 2  •  methylPREDNISolone (MEDROL, ANA PAULA,) 4 MG tablet, Take as directed on package instructions., Disp: 21 tablet, Rfl: 0  •  nortriptyline (PAMELOR) 10 MG capsule, Take 1 capsule by mouth At Night As Needed (nocturnal pain and insomnia). 1-2 TABLETS AT BEDTIME, Disp: 60 capsule, Rfl: 1  •  tiZANidine (ZANAFLEX) 4 MG tablet, Take 4 mg by mouth Every Night. As needed, Disp: , Rfl:   •  Toujeo SoloStar 300 UNIT/ML solution pen-injector injection, , Disp: , Rfl:   •  Turmeric 500 MG tablet, Take 1 tablet by mouth Daily., Disp: , Rfl:     Allergies:  Allergies   Allergen Reactions   • Amoxicillin Unknown (See Comments)   • Levofloxacin Unknown (See Comments)   • Chloraprep One Step [Chlorhexidine Gluconate] Rash       Social History     Tobacco Use   • Smoking status: Never Smoker   • Smokeless tobacco: Never Used   Substance Use Topics   • Alcohol use: No   • Drug use: No       Family History   Problem Relation Age of Onset   • Cancer Father         Colon Ca   • Diabetes Father    • Aneurysm Paternal Grandmother         Stomach Az       Review of Imaging:  No new imaging to review.    Vitals:    01/20/21 1235   Temp: 97.9 °F (36.6 °C)     Body mass index is 29.62 kg/m².    Physical Exam  Neurologic Exam  Visit done via telephone due to the COVID-19 epidemic.  Diagnoses/Plan:    Ms. Senior is a 50 y.o. female is seen today status post C5-6 ACDF in May 2018, with recurrence of neck pain and intermittent bilateral radiculopathy.    The patient has underwent MRI, EMG, and x-ray, which has demonstrated no great etiology of her symptoms.  Patient has exhausted all conservative management with only minimal reduction in her symptoms.  Therefore, I will discuss with Dr. Avendano in regards to a medical senior living.  I noted that I will call the patient after able to discuss this with Dr. Avendano in the next appropriate steps.  Patient noted  understanding and willing to proceed.      Patient's Body mass index is 29.62 kg/m². BMI is above normal parameters. Recommendations include: educational material.             Purvi Pozo PA-C

## 2021-01-20 NOTE — PATIENT INSTRUCTIONS

## 2021-01-25 ENCOUNTER — TELEPHONE (OUTPATIENT)
Dept: NEUROSURGERY | Facility: CLINIC | Age: 51
End: 2021-01-25

## 2021-01-25 NOTE — TELEPHONE ENCOUNTER
I have spoken with Dr. Avendano.  He is okay writing a letter noting the patient's current symptomology and is okay with medical shelter.  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  If patient needs more paperwork, we will send patient for a functional capacity evaluation.      Please let patient know and see where this needs to be sent.  Thanks

## 2021-01-29 NOTE — TELEPHONE ENCOUNTER
PATIENT IS CALLING TO CHECK THE STATUS OF HER REQUEST.  SHE IS WAITING ON E. SUSHILX TO RETURN HER CALL. PLEASE ADVISE    [phone]

## 2021-02-01 ENCOUNTER — OFFICE VISIT (OUTPATIENT)
Dept: PAIN MEDICINE | Facility: CLINIC | Age: 51
End: 2021-02-01

## 2021-02-01 VITALS
DIASTOLIC BLOOD PRESSURE: 74 MMHG | BODY MASS INDEX: 29.02 KG/M2 | SYSTOLIC BLOOD PRESSURE: 131 MMHG | HEART RATE: 82 BPM | RESPIRATION RATE: 14 BRPM | HEIGHT: 65 IN | OXYGEN SATURATION: 98 % | TEMPERATURE: 98.3 F | WEIGHT: 174.2 LBS

## 2021-02-01 DIAGNOSIS — F41.1 GAD (GENERALIZED ANXIETY DISORDER): ICD-10-CM

## 2021-02-01 DIAGNOSIS — M47.812 CERVICAL SPONDYLOSIS WITHOUT MYELOPATHY: ICD-10-CM

## 2021-02-01 DIAGNOSIS — E10.9 INSULIN DEPENDENT DIABETES MELLITUS TYPE IA (HCC): ICD-10-CM

## 2021-02-01 DIAGNOSIS — Z98.1 HX OF FUSION OF CERVICAL SPINE: ICD-10-CM

## 2021-02-01 DIAGNOSIS — M79.18 MYOFASCIAL PAIN: ICD-10-CM

## 2021-02-01 DIAGNOSIS — M48.02 CERVICAL SPINAL STENOSIS: ICD-10-CM

## 2021-02-01 PROCEDURE — 99213 OFFICE O/P EST LOW 20 MIN: CPT | Performed by: NURSE PRACTITIONER

## 2021-02-01 RX ORDER — PREGABALIN 75 MG/1
CAPSULE ORAL
COMMUNITY
Start: 2021-01-06 | End: 2021-04-12

## 2021-02-05 ENCOUNTER — TELEPHONE (OUTPATIENT)
Dept: ENDOCRINOLOGY | Facility: CLINIC | Age: 51
End: 2021-02-05

## 2021-02-05 RX ORDER — FLASH GLUCOSE SENSOR
1 KIT MISCELLANEOUS
Qty: 2 EACH | Refills: 2 | Status: SHIPPED | OUTPATIENT
Start: 2021-02-05 | End: 2021-06-14 | Stop reason: SDUPTHER

## 2021-02-05 NOTE — TELEPHONE ENCOUNTER
[facility] IN [place] CALLED STATING THIS PT IS IN NEED OF A REFILL OF HER Wooop[employer] 1 SENSORS. PLEASE AND THANK YOU

## 2021-03-02 RX ORDER — INSULIN GLARGINE 300 U/ML
INJECTION, SOLUTION SUBCUTANEOUS
Qty: 15 ML | Refills: 0 | Status: SHIPPED | OUTPATIENT
Start: 2021-03-02 | End: 2021-06-14 | Stop reason: SDUPTHER

## 2021-03-02 NOTE — TELEPHONE ENCOUNTER
[facility] CALLED REQUESTING WE SEND IN A REFILL OF TOUJEO TO THEM FOR THIS PT. PLEASE AND THANK YOU

## 2021-04-09 DIAGNOSIS — G54.9 MYELORADICULOPATHY: ICD-10-CM

## 2021-04-09 DIAGNOSIS — Z98.1 HX OF FUSION OF CERVICAL SPINE: Primary | ICD-10-CM

## 2021-04-09 DIAGNOSIS — R20.0 ARM NUMBNESS: ICD-10-CM

## 2021-04-12 RX ORDER — PREGABALIN 75 MG/1
CAPSULE ORAL
Qty: 60 CAPSULE | Refills: 0 | Status: SHIPPED | OUTPATIENT
Start: 2021-04-12 | End: 2022-03-09

## 2021-04-12 NOTE — TELEPHONE ENCOUNTER
We will refill the patient's prescription at this time.  However, please advise patient that patient needs to try to obtain further refills from her PCP which was no longer following on a regular basis.  Thanks

## 2021-05-17 RX ORDER — FLASH GLUCOSE SENSOR
KIT MISCELLANEOUS
Refills: 2 | OUTPATIENT
Start: 2021-05-17

## 2021-06-14 RX ORDER — INSULIN GLARGINE 300 U/ML
INJECTION, SOLUTION SUBCUTANEOUS
Qty: 15 ML | Refills: 0 | Status: SHIPPED | OUTPATIENT
Start: 2021-06-14 | End: 2021-07-21 | Stop reason: SDUPTHER

## 2021-06-14 RX ORDER — FLASH GLUCOSE SENSOR
1 KIT MISCELLANEOUS
Qty: 2 EACH | Refills: 1 | Status: SHIPPED | OUTPATIENT
Start: 2021-06-14 | End: 2021-07-21 | Stop reason: SDUPTHER

## 2021-06-14 NOTE — TELEPHONE ENCOUNTER
PT CALLED REQUESTING A REFILL OF TOUJEO AND BEKAH SENSORS TO BE SENT IN TO [facility] IN [place], [place]. PLEASE AND THANK YOU

## 2021-07-21 ENCOUNTER — TELEPHONE (OUTPATIENT)
Dept: ENDOCRINOLOGY | Facility: CLINIC | Age: 51
End: 2021-07-21

## 2021-07-21 ENCOUNTER — OFFICE VISIT (OUTPATIENT)
Dept: ENDOCRINOLOGY | Facility: CLINIC | Age: 51
End: 2021-07-21

## 2021-07-21 VITALS
HEIGHT: 65 IN | BODY MASS INDEX: 28.49 KG/M2 | OXYGEN SATURATION: 98 % | SYSTOLIC BLOOD PRESSURE: 124 MMHG | WEIGHT: 171 LBS | HEART RATE: 68 BPM | DIASTOLIC BLOOD PRESSURE: 78 MMHG

## 2021-07-21 DIAGNOSIS — E11.65 UNCONTROLLED TYPE 2 DIABETES MELLITUS WITH HYPERGLYCEMIA (HCC): Primary | ICD-10-CM

## 2021-07-21 PROBLEM — E10.9 INSULIN DEPENDENT DIABETES MELLITUS TYPE IA (HCC): Status: RESOLVED | Noted: 2020-12-01 | Resolved: 2021-07-21

## 2021-07-21 LAB
EXPIRATION DATE: ABNORMAL
EXPIRATION DATE: NORMAL
GLUCOSE BLDC GLUCOMTR-MCNC: 152 MG/DL (ref 70–130)
HBA1C MFR BLD: 8.1 %
Lab: ABNORMAL
Lab: NORMAL

## 2021-07-21 PROCEDURE — 82947 ASSAY GLUCOSE BLOOD QUANT: CPT | Performed by: INTERNAL MEDICINE

## 2021-07-21 PROCEDURE — 83036 HEMOGLOBIN GLYCOSYLATED A1C: CPT | Performed by: INTERNAL MEDICINE

## 2021-07-21 PROCEDURE — 99213 OFFICE O/P EST LOW 20 MIN: CPT | Performed by: INTERNAL MEDICINE

## 2021-07-21 RX ORDER — INSULIN GLARGINE 300 U/ML
INJECTION, SOLUTION SUBCUTANEOUS
Qty: 15 ML | Refills: 0 | Status: SHIPPED | OUTPATIENT
Start: 2021-07-21 | End: 2021-07-21

## 2021-07-21 RX ORDER — INSULIN DEGLUDEC 200 U/ML
INJECTION, SOLUTION SUBCUTANEOUS
Qty: 9 ML | Refills: 11 | Status: SHIPPED | OUTPATIENT
Start: 2021-07-21 | End: 2021-11-11 | Stop reason: SDUPTHER

## 2021-07-21 RX ORDER — PEN NEEDLE, DIABETIC 32GX 5/32"
NEEDLE, DISPOSABLE MISCELLANEOUS
Qty: 400 EACH | Refills: 3 | Status: SHIPPED | OUTPATIENT
Start: 2021-07-21 | End: 2022-07-15

## 2021-07-21 RX ORDER — INSULIN LISPRO 100 [IU]/ML
INJECTION, SOLUTION INTRAVENOUS; SUBCUTANEOUS
Qty: 45 ML | Refills: 3 | Status: SHIPPED | OUTPATIENT
Start: 2021-07-21 | End: 2022-03-09

## 2021-07-21 RX ORDER — FLASH GLUCOSE SENSOR
1 KIT MISCELLANEOUS
Qty: 2 EACH | Refills: 1 | Status: SHIPPED | OUTPATIENT
Start: 2021-07-21 | End: 2021-11-11 | Stop reason: SDUPTHER

## 2021-07-21 NOTE — PROGRESS NOTES
"     Office Note      Date: 2021  Patient Name: Pricilla Senior  MRN: [id number]  : 1970    Chief Complaint   Patient presents with   • Diabetes       History of Present Illness:   Pricilla Senior is a 50 y.o. female who presents for Diabetes - type 2  She is on 4 insulin shots per day and uses a anastasiya to check her blood sugar  We reviewed the anastasiya data on her phone  She has a few lows but nothing dramatic      Last A1c:9.1  Hemoglobin A1C   Date Value Ref Range Status   2021 8.1 % Final   2018 8.60 (H) 4.80 - 5.60 % Final       Changes in health since last visit: steroid shot in her back . Last eye exam due net month .    Subjective          Review of Systems:   Review of Systems   Constitutional: Negative.    HENT: Negative.    Eyes: Negative.    Respiratory: Negative.        The following portions of the patient's history were reviewed and updated as appropriate: allergies, current medications, past family history, past medical history, past social history, past surgical history and problem list.    Objective     Visit Vitals  /78 (BP Location: Left arm, Patient Position: Sitting, Cuff Size: Adult)   Pulse 68   Ht 165.1 cm (65\")   Wt 77.6 kg (171 lb)   SpO2 98%   BMI 28.46 kg/m²       Labs:    CMP  Lab Results   Component Value Date    GLUCOSE 121 (H) 2018    BUN 17 2020    CREATININE 0.7 2020    EGFRIFNONA 77 2018    BCR 24 (H) 2020    K 3.8 2020    CO2 30 2020    CALCIUM 9.4 2020    LABIL2 1.5 2020    AST 21 2020    ALT 29 2020        CBC w/DIFF  Lab Results   Component Value Date    WBC 5.2 2020    RBC 4.62 2020    HGB 14.5 2020    HCT 41.0 2020    MCV 88.9 2020    MCH 31.4 2020    MCHC 35.3 2020    RDW 13.2 (H) 2020    RDWSD 41.2 2018    MPV 7.6 2020     2020    NEUTRORELPCT 43.9 2020    MONORELPCT 10.1 2020    EOSRELPCT 2.0 " 09/18/2020    BASORELPCT 0.6 09/18/2020    NEUTROABS 2.3 09/18/2020    LYMPHSABS 2.3 09/18/2020    MONOSABS 0.5 09/18/2020    EOSABS 0.1 09/18/2020    BASOSABS 0.0 09/18/2020       Physical Exam:  Physical Exam  Vitals reviewed.   Constitutional:       Appearance: Normal appearance. She is normal weight.   Cardiovascular:      Pulses:           Dorsalis pedis pulses are 2+ on the right side and 2+ on the left side.        Posterior tibial pulses are 2+ on the right side and 2+ on the left side.   Musculoskeletal:      Right foot: Normal range of motion. No deformity, bunion, Charcot foot, foot drop or prominent metatarsal heads.      Left foot: Normal range of motion. No deformity, bunion, Charcot foot, foot drop or prominent metatarsal heads.   Feet:      Right foot:      Protective Sensation: 5 sites tested. 5 sites sensed.      Skin integrity: Skin integrity normal.      Toenail Condition: Right toenails are normal.      Left foot:      Protective Sensation: 5 sites tested. 5 sites sensed.      Skin integrity: Skin integrity normal.      Toenail Condition: Left toenails are normal.      Comments: Diabetic Foot Exam Performed and Monofilament Test Performed    Neurological:      Mental Status: She is alert.   Psychiatric:         Mood and Affect: Mood normal.         Thought Content: Thought content normal.         Judgment: Judgment normal.          Assessment / Plan      Assessment & Plan:  Problem List Items Addressed This Visit        Other    Uncontrolled type 2 diabetes mellitus with hyperglycemia (CMS/[facility]) - Primary    Current Assessment & Plan     Diabetes is improving with treatment.   Continue current treatment regimen.  Diabetes will be reassessed in 6 months.         Relevant Medications    HumaLOG KwikPen 100 UNIT/ML solution pen-injector    Toujeo SoloStar 300 UNIT/ML solution pen-injector injection    Other Relevant Orders    POC Glycosylated Hemoglobin (Hb A1C) (Completed)    POC Glucose, Blood  (Completed)           Barney Todd MD   07/21/2021

## 2021-11-11 ENCOUNTER — TELEPHONE (OUTPATIENT)
Dept: ENDOCRINOLOGY | Facility: CLINIC | Age: 51
End: 2021-11-11

## 2021-11-11 RX ORDER — INSULIN GLARGINE 300 U/ML
INJECTION, SOLUTION SUBCUTANEOUS
Qty: 15 ML | Refills: 0 | OUTPATIENT
Start: 2021-11-11

## 2021-11-11 RX ORDER — INSULIN DEGLUDEC 200 U/ML
INJECTION, SOLUTION SUBCUTANEOUS
Qty: 9 ML | Refills: 5 | Status: SHIPPED | OUTPATIENT
Start: 2021-11-11 | End: 2021-11-17

## 2021-11-11 RX ORDER — FLASH GLUCOSE SENSOR
1 KIT MISCELLANEOUS
Qty: 2 EACH | Refills: 5 | Status: SHIPPED | OUTPATIENT
Start: 2021-11-11 | End: 2022-03-09 | Stop reason: SDUPTHER

## 2021-11-11 RX ORDER — FLASH GLUCOSE SENSOR
KIT MISCELLANEOUS
Refills: 1 | OUTPATIENT
Start: 2021-11-11

## 2021-11-11 NOTE — TELEPHONE ENCOUNTER
[facility] PHARM CALLED REQUESTING A REFILL OF LONG ACTING INSULINE. I SEE WE SENT SOMETHING IN EARLIER. PLEASE REACH OUT TO PHARM AT EARLIEST CONVENIENCE. THANK YOU

## 2021-11-11 NOTE — TELEPHONE ENCOUNTER
Pharmacy contacted and prescription clarified.  Patient was concerned about Toujeo when the sig actually stated Tresiba 50 units daily.  Pharmacy is contacting patient to clarify script and contacting insurance company to verify supply days stated.

## 2021-11-17 RX ORDER — INSULIN GLARGINE 300 U/ML
INJECTION, SOLUTION SUBCUTANEOUS
COMMUNITY
Start: 2021-10-08 | End: 2021-11-17 | Stop reason: SDUPTHER

## 2021-11-17 RX ORDER — INSULIN GLARGINE 300 U/ML
50 INJECTION, SOLUTION SUBCUTANEOUS DAILY
Qty: 15 ML | Refills: 1 | Status: SHIPPED | OUTPATIENT
Start: 2021-11-17 | End: 2022-01-14

## 2021-11-17 NOTE — TELEPHONE ENCOUNTER
WE SENT IN THE WRONG MEDICATION  PT TAKES [facility] PH[facility]Y    PLEASE CALL THE PT BACK   [phone]

## 2022-01-14 RX ORDER — INSULIN GLARGINE 300 U/ML
INJECTION, SOLUTION SUBCUTANEOUS
Qty: 4.5 ML | Refills: 0 | Status: SHIPPED | OUTPATIENT
Start: 2022-01-14 | End: 2022-02-17 | Stop reason: SDUPTHER

## 2022-02-17 RX ORDER — INSULIN GLARGINE 300 U/ML
50 INJECTION, SOLUTION SUBCUTANEOUS DAILY
Qty: 4.5 ML | Refills: 1 | Status: SHIPPED | OUTPATIENT
Start: 2022-02-17 | End: 2022-02-17

## 2022-02-17 RX ORDER — INSULIN GLARGINE 300 U/ML
INJECTION, SOLUTION SUBCUTANEOUS
Qty: 4.5 ML | Refills: 0 | Status: SHIPPED | OUTPATIENT
Start: 2022-02-17 | End: 2022-03-09 | Stop reason: SDUPTHER

## 2022-02-17 NOTE — TELEPHONE ENCOUNTER
[facility] PHARM CALLED REQUESTING A REFILL OF TOUJEO TO BE SENT IN. PLEASE AND THANK YOU

## 2022-02-17 NOTE — TELEPHONE ENCOUNTER
Please call in for pt her toujeo to [facility] in [place]   Please call pt if you have questions  [phone]

## 2022-02-21 ENCOUNTER — TELEPHONE (OUTPATIENT)
Dept: NEUROSURGERY | Facility: CLINIC | Age: 52
End: 2022-02-21

## 2022-02-21 NOTE — TELEPHONE ENCOUNTER
Caller: LEOBARDO MANNING     Relationship: SELF    Best call back number: [phone]    Chief complaint: NECK PAIN / DISABILITY PAPERWORK     Type of visit: FOLLOW UP     Requested date: ASAP     If rescheduling, when is the original appointment: N/A     Additional notes: PATIENT IS CALLING REQUESTING TO SCHEDULE AN APPT FOR A RE EVALUATION AND TO UPDATE HER DISABILITY PAPERWORK. PATIENT LAST SAW CHENTE SIMMS 1/20/21.

## 2022-02-21 NOTE — TELEPHONE ENCOUNTER
I be happy to see her back to work-up for neck pain.  However we do not do disability paperwork.  She would need to get that from PCP/JEFFREY

## 2022-02-21 NOTE — TELEPHONE ENCOUNTER
Provider:  Juan Alberto/Sánchez  Caller: Patient  Time of call:  3:09   Phone #: [phone]   Surgery:  LT C5-6 ACDF  Surgery Date: 05/09/2018   Last visit: Office Visit with Purvi Pozo PA-C (01/20/2021)    Next visit: NA    Reason for call:         Patient is requesting an appointment for re-evaluation and to update her disability paperwork. I have called patient to gather more information.       When did it start:    Patient states that her neck pain has improved some since she is not working on the computer anymore. She states that the pain has not gotten worse, it is about the same since she has stopped working.    Where is it located:    When she does have pain it is posterior neck and radiates to both of her shoulders, more prominent in her right shoulder.     How long has this been going on/is this new or the same as before surgery:     Surgery helped with symptoms.     Characteristics of symptom/severity:    Patient states that the neck pain is achy and sometimes experiences a burning sensation in her muscles. Patient reports that sometimes that she loses sensation in her right arm.     Timing- Is it constant or intermittent:    Intermittent    What makes it worse:    Working on the computer or driving for long periods of time.     What makes it better:    Rest, not overusing her neck.     What therapies/medications have you tried:    Patient states that she is not taking medications for symptoms.   Patient states that she is not doing any PT at the moment.     Okay to schedule appointment for re-evaluation and discuss disability?

## 2022-02-21 NOTE — TELEPHONE ENCOUNTER
Patient notified and verbalized understanding.  She said she would like to come in for a yearly check up to have her cervical region looked at again.      Patient notified someone from scheduling will call her.

## 2022-03-09 ENCOUNTER — LAB (OUTPATIENT)
Dept: LAB | Facility: HOSPITAL | Age: 52
End: 2022-03-09

## 2022-03-09 ENCOUNTER — OFFICE VISIT (OUTPATIENT)
Dept: ENDOCRINOLOGY | Facility: CLINIC | Age: 52
End: 2022-03-09

## 2022-03-09 VITALS
HEIGHT: 65 IN | HEART RATE: 85 BPM | DIASTOLIC BLOOD PRESSURE: 80 MMHG | SYSTOLIC BLOOD PRESSURE: 118 MMHG | TEMPERATURE: 96.9 F | WEIGHT: 170.2 LBS | BODY MASS INDEX: 28.36 KG/M2 | OXYGEN SATURATION: 96 %

## 2022-03-09 DIAGNOSIS — E11.65 UNCONTROLLED TYPE 2 DIABETES MELLITUS WITH HYPERGLYCEMIA: Primary | ICD-10-CM

## 2022-03-09 DIAGNOSIS — E11.65 UNCONTROLLED TYPE 2 DIABETES MELLITUS WITH HYPERGLYCEMIA: ICD-10-CM

## 2022-03-09 LAB
ALBUMIN SERPL-MCNC: 4.8 G/DL (ref 3.5–5.2)
ALBUMIN/GLOB SERPL: 1.8 G/DL
ALP SERPL-CCNC: 93 U/L (ref 39–117)
ALT SERPL W P-5'-P-CCNC: 63 U/L (ref 1–33)
ANION GAP SERPL CALCULATED.3IONS-SCNC: 13.4 MMOL/L (ref 5–15)
AST SERPL-CCNC: 47 U/L (ref 1–32)
BILIRUB SERPL-MCNC: 0.5 MG/DL (ref 0–1.2)
BUN SERPL-MCNC: 17 MG/DL (ref 6–20)
BUN/CREAT SERPL: 21.3 (ref 7–25)
CALCIUM SPEC-SCNC: 10 MG/DL (ref 8.6–10.5)
CHLORIDE SERPL-SCNC: 100 MMOL/L (ref 98–107)
CHOLEST SERPL-MCNC: 250 MG/DL (ref 0–200)
CO2 SERPL-SCNC: 22.6 MMOL/L (ref 22–29)
CREAT SERPL-MCNC: 0.8 MG/DL (ref 0.57–1)
EGFRCR SERPLBLD CKD-EPI 2021: 89.9 ML/MIN/1.73
EXPIRATION DATE: ABNORMAL
EXPIRATION DATE: NORMAL
GLOBULIN UR ELPH-MCNC: 2.6 GM/DL
GLUCOSE BLDC GLUCOMTR-MCNC: 137 MG/DL (ref 70–130)
GLUCOSE SERPL-MCNC: 138 MG/DL (ref 65–99)
HBA1C MFR BLD: 8.4 %
HDLC SERPL-MCNC: 58 MG/DL (ref 40–60)
LDLC SERPL CALC-MCNC: 173 MG/DL (ref 0–100)
LDLC/HDLC SERPL: 2.93 {RATIO}
Lab: ABNORMAL
Lab: NORMAL
POTASSIUM SERPL-SCNC: 4.6 MMOL/L (ref 3.5–5.2)
PROT SERPL-MCNC: 7.4 G/DL (ref 6–8.5)
SODIUM SERPL-SCNC: 136 MMOL/L (ref 136–145)
TRIGL SERPL-MCNC: 110 MG/DL (ref 0–150)
TSH SERPL DL<=0.05 MIU/L-ACNC: 1.77 UIU/ML (ref 0.27–4.2)
VLDLC SERPL-MCNC: 19 MG/DL (ref 5–40)

## 2022-03-09 PROCEDURE — 82947 ASSAY GLUCOSE BLOOD QUANT: CPT | Performed by: INTERNAL MEDICINE

## 2022-03-09 PROCEDURE — 80053 COMPREHEN METABOLIC PANEL: CPT

## 2022-03-09 PROCEDURE — 83036 HEMOGLOBIN GLYCOSYLATED A1C: CPT | Performed by: INTERNAL MEDICINE

## 2022-03-09 PROCEDURE — 99214 OFFICE O/P EST MOD 30 MIN: CPT | Performed by: INTERNAL MEDICINE

## 2022-03-09 PROCEDURE — 82043 UR ALBUMIN QUANTITATIVE: CPT

## 2022-03-09 PROCEDURE — 80061 LIPID PANEL: CPT

## 2022-03-09 PROCEDURE — 82570 ASSAY OF URINE CREATININE: CPT

## 2022-03-09 PROCEDURE — 84443 ASSAY THYROID STIM HORMONE: CPT

## 2022-03-09 RX ORDER — INSULIN LISPRO 100 [IU]/ML
INJECTION, SOLUTION INTRAVENOUS; SUBCUTANEOUS
Qty: 45 ML | Refills: 3 | Status: SHIPPED | OUTPATIENT
Start: 2022-03-09 | End: 2022-08-16

## 2022-03-09 RX ORDER — INSULIN GLARGINE 300 U/ML
50 INJECTION, SOLUTION SUBCUTANEOUS DAILY
Qty: 4.5 ML | Refills: 5 | Status: SHIPPED | OUTPATIENT
Start: 2022-03-09 | End: 2022-12-29 | Stop reason: SDUPTHER

## 2022-03-09 RX ORDER — FLASH GLUCOSE SENSOR
1 KIT MISCELLANEOUS
Qty: 2 EACH | Refills: 5 | Status: SHIPPED | OUTPATIENT
Start: 2022-03-09 | End: 2022-12-29 | Stop reason: SDUPTHER

## 2022-03-09 NOTE — ASSESSMENT & PLAN NOTE
Worse due to steroids an illness  Based upon the anastasiya pattern, will move he toujeo to bedtime and increase her humalog to 15 units tid

## 2022-03-09 NOTE — PROGRESS NOTES
"     Office Note      Date: 2022  Patient Name: Pricilla Senior  MRN: [id number]  : 1970    Chief Complaint   Patient presents with   • Diabetes       History of Present Illness:   Pricilla Senior is a 51 y.o. female who presents for Diabetes - type 2  She takes 4 shots per day and uses a anastasiya to check her blood sugar  I looked at the data on her phone   Last A1c:8.1  Hemoglobin A1C   Date Value Ref Range Status   2022 8.4 % Final   2018 8.60 (H) 4.80 - 5.60 % Final       Changes in health since last visit: had steroids in January  Had covid last  . Last eye exam january.    Subjective              Review of Systems:   Review of Systems   Constitutional: Negative.    HENT: Negative.    Respiratory: Negative.        The following portions of the patient's history were reviewed and updated as appropriate: allergies, current medications, past family history, past medical history, past social history, past surgical history and problem list.    Objective     Visit Vitals  /80   Pulse 85   Temp 96.9 °F (36.1 °C)   Ht 165.1 cm (65\")   Wt 77.2 kg (170 lb 3.2 oz)   SpO2 96%   BMI 28.32 kg/m²       Labs:    CMP  Lab Results   Component Value Date    GLUCOSE 121 (H) 2018    BUN 17 2020    CREATININE 0.7 2020    EGFRIFNONA 77 2018    BCR 24 (H) 2020    K 3.8 2020    CO2 30 2020    CALCIUM 9.4 2020    LABIL2 1.5 2020    AST 21 2020    ALT 29 2020        CBC w/DIFF  Lab Results   Component Value Date    WBC 5.2 2020    RBC 4.62 2020    HGB 14.5 2020    HCT 41.0 2020    MCV 88.9 2020    MCH 31.4 2020    MCHC 35.3 2020    RDW 13.2 (H) 2020    RDWSD 41.2 2018    MPV 7.6 2020     2020    NEUTRORELPCT 43.9 2020    MONORELPCT 10.1 2020    EOSRELPCT 2.0 2020    BASORELPCT 0.6 2020    NEUTROABS 2.3 2020    LYMPHSABS 2.3 2020 "    MONOSABS 0.5 09/18/2020    EOSABS 0.1 09/18/2020    BASOSABS 0.0 09/18/2020       Physical Exam:  Physical Exam  Vitals reviewed.   Constitutional:       Appearance: Normal appearance.   Neurological:      Mental Status: She is alert.          Assessment / Plan      Assessment & Plan:  Problem List Items Addressed This Visit        Other    Uncontrolled type 2 diabetes mellitus with hyperglycemia (HCC) - Primary    Current Assessment & Plan      Worse due to steroids an illness  Based upon the anastasiya pattern, will move he toujeo to bedtime and increase her humalog to 15 units tid           Relevant Medications    HumaLOG KwikPen 100 UNIT/ML solution pen-injector    Insulin Glargine, 1 Unit Dial, (Toujeo SoloStar) 300 UNIT/ML solution pen-injector injection    Other Relevant Orders    POC Glucose, Blood (Completed)    POC Glycosylated Hemoglobin (Hb A1C) (Completed)    Comprehensive Metabolic Panel    Lipid Panel    Microalbumin / Creatinine Urine Ratio - Urine, Clean Catch    TSH           Barney Todd MD   03/09/2022

## 2022-03-10 LAB
ALBUMIN UR-MCNC: 2.2 MG/DL
CREAT UR-MCNC: 182.2 MG/DL
MICROALBUMIN/CREAT UR: 12.1 MG/G

## 2022-03-15 ENCOUNTER — TELEPHONE (OUTPATIENT)
Dept: ENDOCRINOLOGY | Facility: CLINIC | Age: 52
End: 2022-03-15

## 2022-03-17 RX ORDER — ROSUVASTATIN CALCIUM 5 MG/1
TABLET, COATED ORAL
Qty: 21 TABLET | Refills: 15 | Status: SHIPPED | OUTPATIENT
Start: 2022-03-17 | End: 2023-01-18 | Stop reason: SDUPTHER

## 2022-03-28 ENCOUNTER — OFFICE VISIT (OUTPATIENT)
Dept: NEUROSURGERY | Facility: CLINIC | Age: 52
End: 2022-03-28

## 2022-03-28 VITALS — RESPIRATION RATE: 16 BRPM | BODY MASS INDEX: 28.92 KG/M2 | HEIGHT: 65 IN | TEMPERATURE: 98 F | WEIGHT: 173.6 LBS

## 2022-03-28 DIAGNOSIS — M48.02 CERVICAL SPINAL STENOSIS: Primary | ICD-10-CM

## 2022-03-28 DIAGNOSIS — Z98.1 HX OF FUSION OF CERVICAL SPINE: ICD-10-CM

## 2022-03-28 PROBLEM — R19.4 CHANGE IN BOWEL HABITS: Status: ACTIVE | Noted: 2020-11-12

## 2022-03-28 PROCEDURE — 99214 OFFICE O/P EST MOD 30 MIN: CPT | Performed by: PHYSICIAN ASSISTANT

## 2022-03-28 NOTE — PROGRESS NOTES
Patient: Pricilla Senior  : 1970    Primary Care Provider: Stephanie Pandya PA      Chief Complaint: Ongoing numbness in hands with neck pain    History of Present Illness:       Ms. Senior is a 50 y.o. female  who is seen today status post C5-C6 ACDF that was performed in May 2018.  Patient had resolution of symptoms for several months.  Over the last several months, patient has started to have significant pain in her neck and bilateral shoulders.  She also has been experiencing intermittent numbness (R>L) that radiates along the medial aspect of her arm and terminating into her fourth and fifth digits.   Although, she does note that the numbness into her hands has improved with use of Lyrica.  She notes that she is unable to work more than 1 hour due to the significance of her symptoms.  Compared to her preoperative status, she does note the symptoms are much improved.  She states that with any activity that her neck pain does become significant that she has to stop and rest.  She also notes when she lays flat it helps alleviate her symptoms.  Patient has completed physical therapy that helped alleviate symptoms for a short period of time, however, had no lasting impact.  Patient underwent injections with Dr. Donnelly and noted no changes to symptoms.  Patient takes Zanaflex as needed.  Patient has also attempted amitriptyline, anti-inflammatories regularly, and gabapentin without success.    At last visit in 2021 patient was put on medical disability by my partner Purvi Gutierrezdesiree.  Patient has noted improvement in her symptoms however they are easily exacerbated with repetitive work.  Patient is seeing benefit from being medically retired.  Patient is going to be at 28 years in October of this year which then she will transition to regular [employer] per the [employer] [facility].    She is back today for a reference visit and for renewal of her medical [employer] paperwork.  Patient  states that is really overall no change since being last seen in January and I do not think that further work-up would be necessary    Review of Systems   Constitutional: Negative for activity change, appetite change, chills, diaphoresis, fatigue, fever and unexpected weight change.   HENT: Negative for congestion, dental problem, drooling, ear discharge, ear pain, facial swelling, hearing loss, mouth sores, nosebleeds, postnasal drip, rhinorrhea, sinus pressure, sneezing, sore throat, tinnitus, trouble swallowing and voice change.    Eyes: Negative for photophobia, pain, discharge, redness, itching and visual disturbance.   Respiratory: Negative for apnea, cough, choking, chest tightness, shortness of breath, wheezing and stridor.    Cardiovascular: Negative for chest pain, palpitations and leg swelling.   Gastrointestinal: Negative for abdominal distention, abdominal pain, anal bleeding, blood in stool, constipation, diarrhea, nausea, rectal pain and vomiting.   Endocrine: Negative for cold intolerance, heat intolerance, polydipsia, polyphagia and polyuria.   Genitourinary: Negative for decreased urine volume, difficulty urinating, dysuria, enuresis, flank pain, frequency, genital sores, hematuria and urgency.   Musculoskeletal: Negative for arthralgias, back pain, gait problem, joint swelling, myalgias, neck pain and neck stiffness.        No new complaints at visit today. 3/28/2022   Skin: Negative for color change, pallor, rash and wound.   Allergic/Immunologic: Negative for environmental allergies, food allergies and immunocompromised state.   Neurological: Negative for dizziness, tremors, seizures, syncope, facial asymmetry, speech difficulty, weakness, light-headedness, numbness and headaches.   Hematological: Negative for adenopathy. Does not bruise/bleed easily.   Psychiatric/Behavioral: Negative for agitation, behavioral problems, confusion, decreased concentration, dysphoric mood, hallucinations,  "self-injury, sleep disturbance and suicidal ideas. The patient is not nervous/anxious and is not hyperactive.    All other systems reviewed and are negative.      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 She has never used smokeless tobacco. She reports that she does not drink alcohol and does not use drugs.   SMOKING STATUS: Non-smoker    Surgical History:     Past Surgical History:   Procedure Laterality Date   • ANTERIOR CERVICAL DISCECTOMY W/ FUSION Left 5/9/2018    Procedure: CERVICAL DISCECTOMY ANTERIOR WITH FUSION C5-6 LEFT;  Surgeon: Dawood Avendano MD;  [facility];  Service: Neurosurgery   • CHOLECYSTECTOMY     • COLONOSCOPY  2000   • ENDOSCOPY     • HYSTERECTOMY  06/2016    with bso    • KIDNEY STONE SURGERY      x4   • NISSEN FUNDOPLICATION     • REDUCTION MAMMAPLASTY Bilateral 11/2019       Allergies:   Amoxicillin, Levofloxacin, and Chloraprep one step [chlorhexidine gluconate]    Physical Exam:    Vital Signs:Temp 98 °F (36.7 °C) (Infrared)   Resp 16   Ht 165.1 cm (65\")   Wt 78.7 kg (173 lb 9.6 oz)   BMI 28.89 " kg/m²    BMI: Body mass index is 28.89 kg/m².     GENERAL:   The patient is in no acute distress, and is able to answer all questions appropriately.  Skin:  The incision is well-healed and well approximated.  No signs of infection, bleeding, or erythema.  Musculoskeletal:     strength is 5 out of 5 bilaterally.   Shoulder abduction is 5 out of 5.    Dorsiflexion is 5/5 Bilaterally  Plantarflexion is 5/5 bilaterally  Hip Flexion 5/5 bilaterally.    The patient´s gait is normal without antalgia.  Neurologic:   The patient is alert and oriented by 3.     Pupils are equal and reactive to light.    Visual fields are full.    Extraocular movements are intact without nystagmus.    There is no evidence of central motor drift. No facial droop.  No difficulty with rapid alternating movements.    Sensation is equal bilaterally with no deficit.      Reflexes:  3+ @ biceps, triceps, brachioradialis, as well as the patellar and Achilles tendon bilaterally.  No sign of Prieto's, clonus, or long track signs    Medical Decision Making    Data Review:   No new films reviewed this visit    Diagnosis:   Chronic neck pain  Status post C5-6 anterior cervical discectomy and fusion    Treatment Options:   I am going to keep her off because the minimus we have done for her activity have improved her symptoms but her symptoms are easily aggravated with the same type of issues that she was having prior to being released from work but these are easily resolved and managed whenever she is able to take breaks as needed.    We are to see the patient back on an as-needed basis.    Patient's Body mass index is 28.89 kg/m². indicating that she is overweight (BMI 25-29.9). Patient's (Body mass index is 28.89 kg/m².) indicates that they are overweight with health conditions that include none . Weight is unchanged. BMI is is above average; BMI management plan is completed. We discussed portion control and increasing exercise.    No diagnosis  found.

## 2022-06-15 ENCOUNTER — PRIOR AUTHORIZATION (OUTPATIENT)
Dept: ENDOCRINOLOGY | Facility: CLINIC | Age: 52
End: 2022-06-15

## 2022-06-15 NOTE — TELEPHONE ENCOUNTER
Deniedon June 14  CaseId:[id number];Status:Denied;Review Type:;Appeal Information: Attention:ATTN: CLINICAL APPEALS DEPARTMENT [employer] [address] [address],[place], [place],[address] Phone:[phone] Fax:[phone]; Important - Please read the below note on eAppeals: Please reference the denial letter for information on the rights for an appeal, rationale for the denial, and how to submit an appeal including if any information is needed to support the appeal. Note about urgent situations - Generally, an urgent situation is one which, in the opinion of the provider, the health of the patient may be in serious jeopardy or may experience pain that cannot be adequately controlled while waiting for a decision on the appeal.;  Drug  Lomaira 8MG tablets  Form   Electronic PA Form (2017 [employer])

## 2022-07-15 RX ORDER — PEN NEEDLE, DIABETIC 32GX 5/32"
NEEDLE, DISPOSABLE MISCELLANEOUS
Qty: 400 EACH | Refills: 1 | Status: SHIPPED | OUTPATIENT
Start: 2022-07-15 | End: 2023-01-18 | Stop reason: SDUPTHER

## 2022-08-16 RX ORDER — INSULIN LISPRO 100 [IU]/ML
INJECTION, SOLUTION INTRAVENOUS; SUBCUTANEOUS
Qty: 45 ML | Refills: 1 | Status: SHIPPED | OUTPATIENT
Start: 2022-08-16 | End: 2023-01-18 | Stop reason: SDUPTHER

## 2022-12-29 ENCOUNTER — PRIOR AUTHORIZATION (OUTPATIENT)
Dept: ENDOCRINOLOGY | Facility: CLINIC | Age: 52
End: 2022-12-29

## 2022-12-29 RX ORDER — INSULIN GLARGINE 300 U/ML
50 INJECTION, SOLUTION SUBCUTANEOUS DAILY
Qty: 4.5 ML | Refills: 0 | Status: SHIPPED | OUTPATIENT
Start: 2022-12-29 | End: 2023-01-18 | Stop reason: SDUPTHER

## 2022-12-29 RX ORDER — FLASH GLUCOSE SENSOR
1 KIT MISCELLANEOUS
Qty: 2 EACH | Refills: 0 | Status: SHIPPED | OUTPATIENT
Start: 2022-12-29 | End: 2023-01-18 | Stop reason: SDUPTHER

## 2022-12-29 NOTE — TELEPHONE ENCOUNTER
Drug is covered by current benefit plan. No further PA activity needed  Drug  FreeStyle Nahun 14 Day Sensor  Form  [employer] Electronic PA Form (2017 NCPDP)

## 2022-12-29 NOTE — TELEPHONE ENCOUNTER
Sabina call in for pt her toujeo and anastasiya sensors to [facility] pharmacy in [place]    pts number [phone]

## 2022-12-29 NOTE — TELEPHONE ENCOUNTER
Drug is covered by current benefit plan. No further PA activity needed  Drug  Touchacorta Cortezar 300UNIT/ML pen-injectors  Form  [employer] Electronic PA Form (2017 NCPDP)

## 2023-01-18 ENCOUNTER — OFFICE VISIT (OUTPATIENT)
Dept: ENDOCRINOLOGY | Facility: CLINIC | Age: 53
End: 2023-01-18
Payer: COMMERCIAL

## 2023-01-18 VITALS
BODY MASS INDEX: 27.99 KG/M2 | WEIGHT: 168 LBS | OXYGEN SATURATION: 95 % | HEART RATE: 80 BPM | HEIGHT: 65 IN | DIASTOLIC BLOOD PRESSURE: 75 MMHG | SYSTOLIC BLOOD PRESSURE: 112 MMHG

## 2023-01-18 DIAGNOSIS — E11.65 UNCONTROLLED TYPE 2 DIABETES MELLITUS WITH HYPERGLYCEMIA: Primary | ICD-10-CM

## 2023-01-18 LAB
EXPIRATION DATE: ABNORMAL
EXPIRATION DATE: NORMAL
GLUCOSE BLDC GLUCOMTR-MCNC: 179 MG/DL (ref 70–130)
HBA1C MFR BLD: 7.4 %
Lab: ABNORMAL
Lab: NORMAL

## 2023-01-18 PROCEDURE — 99213 OFFICE O/P EST LOW 20 MIN: CPT | Performed by: INTERNAL MEDICINE

## 2023-01-18 PROCEDURE — 83036 HEMOGLOBIN GLYCOSYLATED A1C: CPT | Performed by: INTERNAL MEDICINE

## 2023-01-18 PROCEDURE — 82947 ASSAY GLUCOSE BLOOD QUANT: CPT | Performed by: INTERNAL MEDICINE

## 2023-01-18 RX ORDER — FLASH GLUCOSE SENSOR
1 KIT MISCELLANEOUS
Qty: 2 EACH | Refills: 0 | Status: SHIPPED | OUTPATIENT
Start: 2023-01-18 | End: 2023-03-07

## 2023-01-18 RX ORDER — ROSUVASTATIN CALCIUM 5 MG/1
TABLET, COATED ORAL
Qty: 21 TABLET | Refills: 15 | Status: SHIPPED | OUTPATIENT
Start: 2023-01-18

## 2023-01-18 RX ORDER — INSULIN GLARGINE 300 U/ML
50 INJECTION, SOLUTION SUBCUTANEOUS DAILY
Qty: 4.5 ML | Refills: 0 | Status: SHIPPED | OUTPATIENT
Start: 2023-01-18 | End: 2023-03-07 | Stop reason: SDUPTHER

## 2023-01-18 RX ORDER — INSULIN LISPRO 100 [IU]/ML
INJECTION, SOLUTION INTRAVENOUS; SUBCUTANEOUS
Qty: 45 ML | Refills: 1 | Status: SHIPPED | OUTPATIENT
Start: 2023-01-18

## 2023-01-18 RX ORDER — PEN NEEDLE, DIABETIC 32GX 5/32"
NEEDLE, DISPOSABLE MISCELLANEOUS
Qty: 400 EACH | Refills: 1 | Status: SHIPPED | OUTPATIENT
Start: 2023-01-18

## 2023-01-18 NOTE — PROGRESS NOTES
"     Office Note      Date: 2023  Patient Name: Pricilla Senior  MRN: [id number]  : 1970    Chief Complaint   Patient presents with   • Diabetes       History of Present Illness:   Pricilla Senior is a 52 y.o. female who presents for Diabetes type 2.   Current RX 4 insulin shots per day     Bg checks are done:>10 times per day with anastasiya   Hypoglycemia :more lately as she has changed her diet. She has cut out the carbs.       Last A1c:  Hemoglobin A1C   Date Value Ref Range Status   2023 7.4 % Final   2018 8.60 (H) 4.80 - 5.60 % Final       Changes in health since last visit: had covid . Last eye exam .    Subjective            Review of Systems:   Review of Systems   Constitutional: Positive for fatigue.       The following portions of the patient's history were reviewed and updated as appropriate: allergies, current medications, past family history, past medical history, past social history, past surgical history and problem list.    Objective     Visit Vitals  /75   Pulse 80   Ht 165.1 cm (65\")   Wt 76.2 kg (168 lb)   SpO2 95%   BMI 27.96 kg/m²           Physical Exam:  Physical Exam  Vitals reviewed.   Constitutional:       Appearance: Normal appearance. She is normal weight.   Cardiovascular:      Pulses:           Dorsalis pedis pulses are 2+ on the right side and 2+ on the left side.        Posterior tibial pulses are 2+ on the right side and 2+ on the left side.   Musculoskeletal:      Right foot: Normal range of motion. No deformity, bunion, Charcot foot, foot drop or prominent metatarsal heads.      Left foot: Normal range of motion. No deformity, bunion, Charcot foot, foot drop or prominent metatarsal heads.   Feet:      Right foot:      Protective Sensation: 10 sites tested. 10 sites sensed.      Skin integrity: Skin integrity normal.      Toenail Condition: Right toenails are normal.      Left foot:      Protective Sensation: 10 sites tested. 10 sites sensed.      " Skin integrity: Skin integrity normal.      Toenail Condition: Left toenails are normal.      Comments: Diabetic Foot Exam Performed and Monofilament Test Performed    Neurological:      Mental Status: She is alert.   Psychiatric:         Mood and Affect: Mood normal.         Behavior: Behavior normal.         Thought Content: Thought content normal.         Judgment: Judgment normal.          Assessment / Plan      Assessment & Plan:  Problem List Items Addressed This Visit        Other    Uncontrolled type 2 diabetes mellitus with hyperglycemia (HCC) - Primary    Current Assessment & Plan     Diabetes is improving with treatment.   Continue current treatment regimen.  Diabetes will be reassessed in 6 months     Dramatic improvement due to restricting carbs. Keep it up . .         Relevant Medications    HumaLOG KwikPen 100 UNIT/ML solution pen-injector    Insulin Glargine, 1 Unit Dial, (Toujeo SoloStar) 300 UNIT/ML solution pen-injector injection    Other Relevant Orders    POC Glucose, Blood (Completed)    POC Glycosylated Hemoglobin (Hb A1C) (Completed)        Barney Todd MD   01/18/2023

## 2023-01-18 NOTE — ASSESSMENT & PLAN NOTE
Diabetes is improving with treatment.   Continue current treatment regimen.  Diabetes will be reassessed in 6 months     Dramatic improvement due to restricting carbs. Keep it up . .

## 2023-03-07 RX ORDER — INSULIN GLARGINE 300 U/ML
50 INJECTION, SOLUTION SUBCUTANEOUS DAILY
Qty: 4.5 ML | Refills: 0 | Status: SHIPPED | OUTPATIENT
Start: 2023-03-07

## 2023-03-07 RX ORDER — FLASH GLUCOSE SENSOR
1 KIT MISCELLANEOUS
Qty: 2 EACH | Refills: 0 | Status: SHIPPED | OUTPATIENT
Start: 2023-03-07

## 2023-03-07 RX ORDER — FLASH GLUCOSE SENSOR
KIT MISCELLANEOUS
Qty: 2 EACH | Refills: 5 | Status: SHIPPED | OUTPATIENT
Start: 2023-03-07

## 2023-04-24 RX ORDER — INSULIN GLARGINE 300 U/ML
50 INJECTION, SOLUTION SUBCUTANEOUS DAILY
Qty: 4.5 ML | Refills: 5 | Status: SHIPPED | OUTPATIENT
Start: 2023-04-24

## 2023-04-24 NOTE — TELEPHONE ENCOUNTER
Rx Refill Note  Requested Prescriptions     Pending Prescriptions Disp Refills   • Insulin Glargine, 1 Unit Dial, (Toujeo SoloStar) 300 UNIT/ML solution pen-injector injection 4.5 mL 0     Sig: Inject 50 Units under the skin into the appropriate area as directed Daily. MUST KEEP APPT. FOR FURTHER REFILLS      Last office visit with prescribing clinician: 1/18/2023     Next office visit with prescribing clinician: 7/21/2023

## 2023-07-21 PROCEDURE — 84443 ASSAY THYROID STIM HORMONE: CPT | Performed by: INTERNAL MEDICINE

## 2023-07-21 PROCEDURE — 82570 ASSAY OF URINE CREATININE: CPT | Performed by: INTERNAL MEDICINE

## 2023-07-21 PROCEDURE — 82043 UR ALBUMIN QUANTITATIVE: CPT | Performed by: INTERNAL MEDICINE

## 2023-07-21 PROCEDURE — 80053 COMPREHEN METABOLIC PANEL: CPT | Performed by: INTERNAL MEDICINE

## 2023-07-21 PROCEDURE — 80061 LIPID PANEL: CPT | Performed by: INTERNAL MEDICINE

## 2023-12-27 NOTE — TELEPHONE ENCOUNTER
See result note.   ,[email or web address] ,[email or web address]

## 2024-02-01 RX ORDER — INSULIN LISPRO 100 [IU]/ML
INJECTION, SOLUTION INTRAVENOUS; SUBCUTANEOUS
Qty: 45 ML | Refills: 0 | Status: SHIPPED | OUTPATIENT
Start: 2024-02-01

## 2024-02-01 RX ORDER — INSULIN GLARGINE 300 U/ML
50 INJECTION, SOLUTION SUBCUTANEOUS DAILY
Qty: 4.5 ML | Refills: 0 | Status: SHIPPED | OUTPATIENT
Start: 2024-02-01 | End: 2024-02-02

## 2024-02-01 NOTE — TELEPHONE ENCOUNTER
Rx Refill Note  Requested Prescriptions     Pending Prescriptions Disp Refills    HumaLOG KwikPen 100 UNIT/ML solution pen-injector [Pharmacy Med Name: HUMALOG 100 UNIT/ML KWIKPEN] 45 mL 1     Sig: INJECT 15 UNITS UNDER THE SKIN THREE TIMES A DAY WITH MEALS    Toujeo SoloStar 300 UNIT/ML solution pen-injector injection [Pharmacy Med Name: TOUJEO SOLOSTAR 300 UNIT/ML] 4.5 mL 4     Sig: INJECT 50 UNITS UNDER THE SKIN INTO THE APPROPRIATE AREA AS DIRECTED DAILY      Last office visit with prescribing clinician: 7/21/2023     Next office visit with prescribing clinician: 2/9/2024

## 2024-02-02 ENCOUNTER — PRIOR AUTHORIZATION (OUTPATIENT)
Dept: ENDOCRINOLOGY | Facility: CLINIC | Age: 54
End: 2024-02-02

## 2024-02-02 ENCOUNTER — TELEPHONE (OUTPATIENT)
Dept: ENDOCRINOLOGY | Facility: CLINIC | Age: 54
End: 2024-02-02

## 2024-02-02 NOTE — TELEPHONE ENCOUNTER
Pt called back to say that the Toujeo is not covered but lantus is can we call this in for her to [facility]  she is out of the toujeo    Please call the pt back  [phone]

## 2024-02-02 NOTE — TELEPHONE ENCOUNTER
Caller: [id number] - [address]  - [phone]  - [phone] FX    Relationship to patient: Pharmacy    Best call back number: [phone]    Patient is needing: STATED INSURANCE WOULD NOT COVER THE TOUJEO AND WANTED TO INFORM OFFICE THAT THEY HAVE CREATED A PA

## 2024-03-11 ENCOUNTER — OFFICE VISIT (OUTPATIENT)
Dept: ENDOCRINOLOGY | Facility: CLINIC | Age: 54
End: 2024-03-11
Payer: COMMERCIAL

## 2024-03-11 VITALS
DIASTOLIC BLOOD PRESSURE: 74 MMHG | OXYGEN SATURATION: 98 % | WEIGHT: 176 LBS | HEIGHT: 65 IN | BODY MASS INDEX: 29.32 KG/M2 | SYSTOLIC BLOOD PRESSURE: 118 MMHG | HEART RATE: 80 BPM

## 2024-03-11 DIAGNOSIS — Z79.4 TYPE 2 DIABETES MELLITUS WITH HYPOGLYCEMIA WITHOUT COMA, WITH LONG-TERM CURRENT USE OF INSULIN: Primary | ICD-10-CM

## 2024-03-11 DIAGNOSIS — E11.649 TYPE 2 DIABETES MELLITUS WITH HYPOGLYCEMIA WITHOUT COMA, WITH LONG-TERM CURRENT USE OF INSULIN: Primary | ICD-10-CM

## 2024-03-11 PROBLEM — E11.9 TYPE 2 DIABETES MELLITUS: Status: ACTIVE | Noted: 2021-07-21

## 2024-03-11 LAB
EXPIRATION DATE: ABNORMAL
EXPIRATION DATE: ABNORMAL
GLUCOSE BLDC GLUCOMTR-MCNC: 163 MG/DL (ref 70–130)
HBA1C MFR BLD: 6.9 % (ref 4.5–5.7)
Lab: ABNORMAL
Lab: ABNORMAL

## 2024-03-11 PROCEDURE — 99214 OFFICE O/P EST MOD 30 MIN: CPT | Performed by: PHYSICIAN ASSISTANT

## 2024-03-11 PROCEDURE — 83036 HEMOGLOBIN GLYCOSYLATED A1C: CPT | Performed by: PHYSICIAN ASSISTANT

## 2024-03-11 PROCEDURE — 82947 ASSAY GLUCOSE BLOOD QUANT: CPT | Performed by: PHYSICIAN ASSISTANT

## 2024-03-11 RX ORDER — INSULIN LISPRO 100 [IU]/ML
INJECTION, SOLUTION INTRAVENOUS; SUBCUTANEOUS
Qty: 45 ML | Refills: 3 | Status: SHIPPED | OUTPATIENT
Start: 2024-03-11

## 2024-03-11 NOTE — PROGRESS NOTES
"     Office Note      Date: 2024  Patient Name: Pricilla Senior  MRN: [id number]  : 1970    Chief Complaint   Patient presents with    Diabetes       History of Present Illness:   Pricilla Senior is a 53 y.o. female who presents for Diabetes type 2.   Current RX: Lantus 50 units, Humalog 15 units TID    Bg checks are done: continuously with Dexcom  Hypoglycemia: frequent overnight lows in the last few weeks, high 50s to 60s; able to treat with juice    Pt switched to Lantus, from Toujeo, a few months ago. Having more lows at night. She has been doing better with her eating.    Eats at least 2 meals a day.    Last A1c:  Hemoglobin A1C   Date Value Ref Range Status   2024 6.9 (A) 4.5 - 5.7 % Final   2018 8.60 (H) 4.80 - 5.60 % Final     The last 2 weeks of CGM data are reviewed.  10 % are low  85 % are in range  4 % are 180-250  1 % are >250  The glycemic pattern shows: Morning hypoglycemia, in target range throughout the rest of the day      Changes in health since last visit: respiratory illness.     DM Health Maintenance:  Ophtho: 2023  Monofilament / Foot exam: 24   Lipids/Statin: taking a statin with last FLP showing LDL 79, 23  XENIA: 23  TSH: 1.090, 23  Aspirin: not indicated  ACE/ARB: not indicated     Subjective      Diabetic Complications:  Eyes: No  Kidneys: No  Feet: No  Heart: No        Review of Systems:   Review of Systems   Constitutional:  Negative for activity change, appetite change and unexpected weight change.   Gastrointestinal:  Negative for constipation, diarrhea and nausea.   Endocrine: Negative.        The following portions of the patient's history were reviewed and updated as appropriate: allergies, current medications, past family history, past medical history, past social history, past surgical history, and problem list.    Objective     Visit Vitals  /74   Pulse 80   Ht 165.1 cm (65\")   Wt 79.8 kg (176 lb)   SpO2 98%   BMI 29.29 " kg/m²           Physical Exam:  Physical Exam  Vitals and nursing note reviewed.   Constitutional:       Appearance: She is well-developed.   HENT:      Head: Normocephalic and atraumatic.   Eyes:      Conjunctiva/sclera: Conjunctivae normal.   Cardiovascular:      Rate and Rhythm: Normal rate.      Pulses:           Dorsalis pedis pulses are 2+ on the right side and 2+ on the left side.        Posterior tibial pulses are 2+ on the right side and 2+ on the left side.   Pulmonary:      Effort: Pulmonary effort is normal.   Musculoskeletal:      Cervical back: Normal range of motion.   Feet:      Right foot:      Protective Sensation: 10 sites tested.  10 sites sensed.      Skin integrity: Skin integrity normal.      Left foot:      Protective Sensation: 10 sites tested.  10 sites sensed.      Skin integrity: Skin integrity normal.      Comments: Diabetic Foot Exam Performed and Monofilament Test Performed      Psychiatric:         Behavior: Behavior normal.          Assessment / Plan      Assessment & Plan:  Diagnoses and all orders for this visit:    1. Type 2 diabetes mellitus with hypoglycemia without coma, with long-term current use of insulin (Primary)  Assessment & Plan:  Diabetes is improving with lifestyle modifications.   Medication changes per orders.  Reminded to bring in blood sugar diary at next visit.  Recommended an ADA diet.  Regular aerobic exercise.  Discussed ways to avoid symptomatic hypoglycemia.  Discussed foot care.    Due to overnight hypoglycemia, decrease Lantus from 50 to 45 units daily. If she develops morning hyperglycemia, can titrate up by 2 units. Continue current dose of Humalog 15 units with meals. Refills sent.    Diabetes will be reassessed in 6 months    Orders:  -     POC Glucose, Blood  -     POC Glycosylated Hemoglobin (Hb A1C)  -     HumaLOG KwikPen 100 UNIT/ML solution pen-injector; INJECT 15 UNITS UNDER THE SKIN THREE TIMES A DAY WITH MEALS  Dispense: 45 mL; Refill: 3  -      Continuous Blood Gluc Sensor (FreeStyle Nahun 2 Sensor) misc; Use 1 kit Every 14 (Fourteen) Days.  Dispense: 2 each; Refill: 11        Return in about 6 months (around 9/11/2024) for Follow up with Dr. Tdod, fasting labs.    Electronically signed by Robyn Vieira PA-C  [facility] [place]  03/11/2024

## 2024-03-11 NOTE — ASSESSMENT & PLAN NOTE
Diabetes is improving with lifestyle modifications.   Medication changes per orders.  Reminded to bring in blood sugar diary at next visit.  Recommended an ADA diet.  Regular aerobic exercise.  Discussed ways to avoid symptomatic hypoglycemia.  Discussed foot care.    Due to overnight hypoglycemia, decrease Lantus from 50 to 45 units daily. If she develops morning hyperglycemia, can titrate up by 2 units. Continue current dose of Humalog 15 units with meals. Refills sent.    Diabetes will be reassessed in 6 months

## 2024-05-13 ENCOUNTER — NURSE TRIAGE (OUTPATIENT)
Dept: CALL CENTER | Facility: HOSPITAL | Age: 54
End: 2024-05-13
Payer: COMMERCIAL

## 2024-05-13 NOTE — TELEPHONE ENCOUNTER
"Patient states that she has been having frequent chest pain for the last 2 weeks. It is worse during the day. It is only relieved by rest. It is in the center of her chest and radiates to her back ans shoulders. She states that sometimes she breaks out in a cold sweat and has been nauseous. She is trying to make a new patient appointment.   Triage completed and patient advised to go to the ED. She said she will go this afternoon.   Reason for Disposition   [1] Chest pain (or \"angina\") comes and goes AND [2] is happening more often (increasing in frequency) or getting worse (increasing in severity)  (Exception: Chest pains that last only a few seconds.)    Additional Information   Negative: SEVERE difficulty breathing (e.g., struggling for each breath, speaks in single words)   Negative: Difficult to awaken or acting confused (e.g., disoriented, slurred speech)   Negative: Shock suspected (e.g., cold/pale/clammy skin, too weak to stand, low BP, rapid pulse)   Negative: Passed out (i.e., lost consciousness, collapsed and was not responding)   Negative: [1] Chest pain lasts > 5 minutes AND [2] age > 44   Negative: [1] Chest pain lasts > 5 minutes AND [2] age > 30 AND [3] one or more cardiac risk factors (e.g., diabetes, high blood pressure, high cholesterol, smoker, or strong family history of heart disease)   Negative: [1] Chest pain lasts > 5 minutes AND [2] history of heart disease (i.e., angina, heart attack, heart failure, bypass surgery, takes nitroglycerin)   Negative: [1] Chest pain lasts > 5 minutes AND [2] described as crushing, pressure-like, or heavy   Negative: Heart beating < 50 beats per minute OR > 140 beats per minute   Negative: Visible sweat on face or sweat dripping down face   Negative: Sounds like a life-threatening emergency to the triager   Negative: Followed a chest injury   Negative: SEVERE chest pain    Answer Assessment - Initial Assessment Questions  1. LOCATION: \"Where does it hurt?\"       " " Center of the chest   2. RADIATION: \"Does the pain go anywhere else?\" (e.g., into neck, jaw, arms, back)    Back and shoulder blades   3. ONSET: \"When did the chest pain begin?\" (Minutes, hours or days)      Few weeks   4. PATTERN: \"Does the pain come and go, or has it been constant since it started?\"  \"Does it get worse with exertion?\"       Comes and goes, better at rest   5. DURATION: \"How long does it last\" (e.g., seconds, minutes, hours)     All throughout the day, it eases up when she sits and rests  6. SEVERITY: \"How bad is the pain?\"  (e.g., Scale 1-10; mild, moderate, or severe)     - MILD (1-3): doesn't interfere with normal activities      - MODERATE (4-7): interferes with normal activities or awakens from sleep     - SEVERE (8-10): excruciating pain, unable to do any normal activities        Moderate to severe  7. CARDIAC RISK FACTORS: \"Do you have any history of heart problems or risk factors for heart disease?\" (e.g., angina, prior heart attack; diabetes, high blood pressure, high cholesterol, smoker, or strong family history of heart disease)      DM, HLD, HTN  8. PULMONARY RISK FACTORS: \"Do you have any history of lung disease?\"  (e.g., blood clots in lung, asthma, emphysema, birth control pills)     No   9. CAUSE: \"What do you think is causing the chest pain?\"   Possible strain   10. OTHER SYMPTOMS: \"Do you have any other symptoms?\" (e.g., dizziness, nausea, vomiting, sweating, fever, difficulty breathing, cough)    Nausea, breaks out in a cold sweat, gas pressure, belching,   11. PREGNANCY: \"Is there any chance you are pregnant?\" \"When was your last menstrual period?\"        No    Protocols used: Chest Pain-ADULT-AH    "

## 2024-06-04 ENCOUNTER — TELEPHONE (OUTPATIENT)
Dept: ENDOCRINOLOGY | Facility: CLINIC | Age: 54
End: 2024-06-04
Payer: COMMERCIAL

## 2024-06-04 RX ORDER — PEN NEEDLE, DIABETIC 32GX 5/32"
NEEDLE, DISPOSABLE MISCELLANEOUS
Qty: 400 EACH | Refills: 1 | Status: SHIPPED | OUTPATIENT
Start: 2024-06-04

## 2024-06-04 NOTE — TELEPHONE ENCOUNTER
Pharmacy called for refills of    Insulin Pen Needle (BD Pen Needle Patsy U/F) 32G X 4 MM misc     Send to:  [id number] - [place], [place] - [address] - [phone] [facility] [phone] FX

## 2024-07-10 ENCOUNTER — TELEPHONE (OUTPATIENT)
Dept: ENDOCRINOLOGY | Facility: CLINIC | Age: 54
End: 2024-07-10
Payer: COMMERCIAL

## 2024-09-23 ENCOUNTER — OFFICE VISIT (OUTPATIENT)
Age: 54
End: 2024-09-23
Payer: COMMERCIAL

## 2024-09-23 VITALS
OXYGEN SATURATION: 98 % | SYSTOLIC BLOOD PRESSURE: 114 MMHG | WEIGHT: 173 LBS | BODY MASS INDEX: 28.82 KG/M2 | HEART RATE: 70 BPM | DIASTOLIC BLOOD PRESSURE: 72 MMHG | HEIGHT: 65 IN

## 2024-09-23 DIAGNOSIS — E11.649 TYPE 2 DIABETES MELLITUS WITH HYPOGLYCEMIA WITHOUT COMA, WITH LONG-TERM CURRENT USE OF INSULIN: Primary | ICD-10-CM

## 2024-09-23 DIAGNOSIS — Z79.4 TYPE 2 DIABETES MELLITUS WITH HYPOGLYCEMIA WITHOUT COMA, WITH LONG-TERM CURRENT USE OF INSULIN: Primary | ICD-10-CM

## 2024-09-23 LAB
ALBUMIN SERPL-MCNC: 3.9 G/DL (ref 3.5–5.2)
ALBUMIN UR-MCNC: <1.2 MG/DL
ALBUMIN/GLOB SERPL: 1.3 G/DL
ALP SERPL-CCNC: 84 U/L (ref 39–117)
ALT SERPL W P-5'-P-CCNC: 19 U/L (ref 1–33)
ANION GAP SERPL CALCULATED.3IONS-SCNC: 11.1 MMOL/L (ref 5–15)
AST SERPL-CCNC: 18 U/L (ref 1–32)
BILIRUB SERPL-MCNC: 0.2 MG/DL (ref 0–1.2)
BUN SERPL-MCNC: 16 MG/DL (ref 6–20)
BUN/CREAT SERPL: 20.5 (ref 7–25)
CALCIUM SPEC-SCNC: 9.8 MG/DL (ref 8.6–10.5)
CHLORIDE SERPL-SCNC: 105 MMOL/L (ref 98–107)
CHOLEST SERPL-MCNC: 205 MG/DL (ref 0–200)
CO2 SERPL-SCNC: 24.9 MMOL/L (ref 22–29)
CREAT SERPL-MCNC: 0.78 MG/DL (ref 0.57–1)
CREAT UR-MCNC: 90.3 MG/DL
EGFRCR SERPLBLD CKD-EPI 2021: 91.5 ML/MIN/1.73
EXPIRATION DATE: ABNORMAL
EXPIRATION DATE: ABNORMAL
GLOBULIN UR ELPH-MCNC: 3.1 GM/DL
GLUCOSE BLDC GLUCOMTR-MCNC: 166 MG/DL (ref 70–130)
GLUCOSE SERPL-MCNC: 146 MG/DL (ref 65–99)
HBA1C MFR BLD: 6.3 % (ref 4.5–5.7)
HDLC SERPL-MCNC: 51 MG/DL (ref 40–60)
LDLC SERPL CALC-MCNC: 135 MG/DL (ref 0–100)
LDLC/HDLC SERPL: 2.6 {RATIO}
Lab: ABNORMAL
Lab: ABNORMAL
MICROALBUMIN/CREAT UR: NORMAL MG/G{CREAT}
POTASSIUM SERPL-SCNC: 4.8 MMOL/L (ref 3.5–5.2)
PROT SERPL-MCNC: 7 G/DL (ref 6–8.5)
SODIUM SERPL-SCNC: 141 MMOL/L (ref 136–145)
TRIGL SERPL-MCNC: 108 MG/DL (ref 0–150)
TSH SERPL DL<=0.05 MIU/L-ACNC: 2.74 UIU/ML (ref 0.27–4.2)
VLDLC SERPL-MCNC: 19 MG/DL (ref 5–40)

## 2024-09-23 PROCEDURE — 82570 ASSAY OF URINE CREATININE: CPT | Performed by: INTERNAL MEDICINE

## 2024-09-23 PROCEDURE — 80061 LIPID PANEL: CPT | Performed by: INTERNAL MEDICINE

## 2024-09-23 PROCEDURE — 82947 ASSAY GLUCOSE BLOOD QUANT: CPT | Performed by: INTERNAL MEDICINE

## 2024-09-23 PROCEDURE — 82043 UR ALBUMIN QUANTITATIVE: CPT | Performed by: INTERNAL MEDICINE

## 2024-09-23 PROCEDURE — 99213 OFFICE O/P EST LOW 20 MIN: CPT | Performed by: INTERNAL MEDICINE

## 2024-09-23 PROCEDURE — 80053 COMPREHEN METABOLIC PANEL: CPT | Performed by: INTERNAL MEDICINE

## 2024-09-23 PROCEDURE — 84443 ASSAY THYROID STIM HORMONE: CPT | Performed by: INTERNAL MEDICINE

## 2024-09-23 PROCEDURE — 83036 HEMOGLOBIN GLYCOSYLATED A1C: CPT | Performed by: INTERNAL MEDICINE

## 2024-09-23 RX ORDER — ROSUVASTATIN CALCIUM 5 MG/1
TABLET, COATED ORAL
Qty: 21 TABLET | Refills: 15 | Status: SHIPPED | OUTPATIENT
Start: 2024-09-23 | End: 2024-09-24 | Stop reason: SDUPTHER

## 2024-09-23 RX ORDER — PEN NEEDLE, DIABETIC 32GX 5/32"
NEEDLE, DISPOSABLE MISCELLANEOUS
Qty: 400 EACH | Refills: 1 | Status: SHIPPED | OUTPATIENT
Start: 2024-09-23

## 2024-09-24 RX ORDER — ROSUVASTATIN CALCIUM 5 MG/1
TABLET, COATED ORAL
Qty: 30 TABLET | Refills: 11 | Status: SHIPPED | OUTPATIENT
Start: 2024-09-24

## 2024-11-05 ENCOUNTER — HOSPITAL ENCOUNTER (EMERGENCY)
Facility: HOSPITAL | Age: 54
Discharge: HOME | End: 2024-11-05
Attending: STUDENT IN AN ORGANIZED HEALTH CARE EDUCATION/TRAINING PROGRAM | Admitting: STUDENT IN AN ORGANIZED HEALTH CARE EDUCATION/TRAINING PROGRAM
Payer: COMMERCIAL

## 2024-11-05 ENCOUNTER — APPOINTMENT (OUTPATIENT)
Facility: HOSPITAL | Age: 54
End: 2024-11-05
Payer: COMMERCIAL

## 2024-11-05 VITALS
HEART RATE: 75 BPM | BODY MASS INDEX: 28.99 KG/M2 | SYSTOLIC BLOOD PRESSURE: 143 MMHG | HEIGHT: 65 IN | TEMPERATURE: 98.9 F | DIASTOLIC BLOOD PRESSURE: 60 MMHG | WEIGHT: 174 LBS | RESPIRATION RATE: 18 BRPM | OXYGEN SATURATION: 97 %

## 2024-11-05 DIAGNOSIS — N20.1 CALCULUS OF URETEROVESICAL JUNCTION (UVJ): Primary | ICD-10-CM

## 2024-11-05 LAB
ALBUMIN SERPL-MCNC: 4.4 G/DL (ref 3.5–5.2)
ALBUMIN/GLOB SERPL: 1.7 G/DL
ALP SERPL-CCNC: 83 U/L (ref 39–117)
ALT SERPL W P-5'-P-CCNC: 19 U/L (ref 1–33)
AMORPH URATE CRY URNS QL MICRO: ABNORMAL /HPF
ANION GAP SERPL CALCULATED.3IONS-SCNC: 12.1 MMOL/L (ref 5–15)
AST SERPL-CCNC: 26 U/L (ref 1–32)
BACTERIA UR QL AUTO: ABNORMAL /HPF
BASOPHILS # BLD AUTO: 0.03 10*3/MM3 (ref 0–0.2)
BASOPHILS NFR BLD AUTO: 0.5 % (ref 0–1.5)
BILIRUB SERPL-MCNC: 0.3 MG/DL (ref 0–1.2)
BILIRUB UR QL STRIP: NEGATIVE
BUN SERPL-MCNC: 16 MG/DL (ref 6–20)
BUN/CREAT SERPL: 20 (ref 7–25)
CALCIUM SPEC-SCNC: 9.3 MG/DL (ref 8.6–10.5)
CHLORIDE SERPL-SCNC: 104 MMOL/L (ref 98–107)
CLARITY UR: ABNORMAL
CO2 SERPL-SCNC: 23.9 MMOL/L (ref 22–29)
COLOR UR: YELLOW
CREAT SERPL-MCNC: 0.8 MG/DL (ref 0.57–1)
D-LACTATE SERPL-SCNC: 1.7 MMOL/L (ref 0.5–2)
DEPRECATED RDW RBC AUTO: 41.2 FL (ref 37–54)
EGFRCR SERPLBLD CKD-EPI 2021: 88.2 ML/MIN/1.73
EOSINOPHIL # BLD AUTO: 0.16 10*3/MM3 (ref 0–0.4)
EOSINOPHIL NFR BLD AUTO: 2.5 % (ref 0.3–6.2)
ERYTHROCYTE [DISTWIDTH] IN BLOOD BY AUTOMATED COUNT: 12.6 % (ref 12.3–15.4)
GLOBULIN UR ELPH-MCNC: 2.6 GM/DL
GLUCOSE SERPL-MCNC: 183 MG/DL (ref 65–99)
GLUCOSE UR STRIP-MCNC: NEGATIVE MG/DL
HCT VFR BLD AUTO: 39.5 % (ref 34–46.6)
HGB BLD-MCNC: 13.3 G/DL (ref 12–15.9)
HGB UR QL STRIP.AUTO: ABNORMAL
HOLD SPECIMEN: NORMAL
HYALINE CASTS UR QL AUTO: ABNORMAL /LPF
IMM GRANULOCYTES # BLD AUTO: 0.01 10*3/MM3 (ref 0–0.05)
IMM GRANULOCYTES NFR BLD AUTO: 0.2 % (ref 0–0.5)
KETONES UR QL STRIP: ABNORMAL
LEUKOCYTE ESTERASE UR QL STRIP.AUTO: NEGATIVE
LIPASE SERPL-CCNC: 21 U/L (ref 13–60)
LYMPHOCYTES # BLD AUTO: 2.72 10*3/MM3 (ref 0.7–3.1)
LYMPHOCYTES NFR BLD AUTO: 43.3 % (ref 19.6–45.3)
MCH RBC QN AUTO: 29.6 PG (ref 26.6–33)
MCHC RBC AUTO-ENTMCNC: 33.7 G/DL (ref 31.5–35.7)
MCV RBC AUTO: 88 FL (ref 79–97)
MONOCYTES # BLD AUTO: 0.42 10*3/MM3 (ref 0.1–0.9)
MONOCYTES NFR BLD AUTO: 6.7 % (ref 5–12)
NEUTROPHILS NFR BLD AUTO: 2.94 10*3/MM3 (ref 1.7–7)
NEUTROPHILS NFR BLD AUTO: 46.8 % (ref 42.7–76)
NITRITE UR QL STRIP: NEGATIVE
PH UR STRIP.AUTO: 5.5 [PH] (ref 5–8)
PLATELET # BLD AUTO: 268 10*3/MM3 (ref 140–450)
PMV BLD AUTO: 8.9 FL (ref 6–12)
POTASSIUM SERPL-SCNC: 4.1 MMOL/L (ref 3.5–5.2)
PROT SERPL-MCNC: 7 G/DL (ref 6–8.5)
PROT UR QL STRIP: ABNORMAL
RBC # BLD AUTO: 4.49 10*6/MM3 (ref 3.77–5.28)
RBC # UR STRIP: ABNORMAL /HPF
REF LAB TEST METHOD: ABNORMAL
SODIUM SERPL-SCNC: 140 MMOL/L (ref 136–145)
SP GR UR STRIP: >=1.03 (ref 1–1.03)
SQUAMOUS #/AREA URNS HPF: ABNORMAL /HPF
UROBILINOGEN UR QL STRIP: ABNORMAL
WBC # UR STRIP: ABNORMAL /HPF
WBC NRBC COR # BLD AUTO: 6.28 10*3/MM3 (ref 3.4–10.8)
WHOLE BLOOD HOLD COAG: NORMAL
WHOLE BLOOD HOLD SPECIMEN: NORMAL

## 2024-11-05 PROCEDURE — 83605 ASSAY OF LACTIC ACID: CPT | Performed by: STUDENT IN AN ORGANIZED HEALTH CARE EDUCATION/TRAINING PROGRAM

## 2024-11-05 PROCEDURE — 83690 ASSAY OF LIPASE: CPT | Performed by: STUDENT IN AN ORGANIZED HEALTH CARE EDUCATION/TRAINING PROGRAM

## 2024-11-05 PROCEDURE — 74176 CT ABD & PELVIS W/O CONTRAST: CPT

## 2024-11-05 PROCEDURE — 85025 COMPLETE CBC W/AUTO DIFF WBC: CPT | Performed by: STUDENT IN AN ORGANIZED HEALTH CARE EDUCATION/TRAINING PROGRAM

## 2024-11-05 PROCEDURE — 81001 URINALYSIS AUTO W/SCOPE: CPT | Performed by: STUDENT IN AN ORGANIZED HEALTH CARE EDUCATION/TRAINING PROGRAM

## 2024-11-05 PROCEDURE — 25010000002 MORPHINE PER 10 MG: Performed by: STUDENT IN AN ORGANIZED HEALTH CARE EDUCATION/TRAINING PROGRAM

## 2024-11-05 PROCEDURE — 99284 EMERGENCY DEPT VISIT MOD MDM: CPT

## 2024-11-05 PROCEDURE — 80053 COMPREHEN METABOLIC PANEL: CPT | Performed by: STUDENT IN AN ORGANIZED HEALTH CARE EDUCATION/TRAINING PROGRAM

## 2024-11-05 PROCEDURE — 96374 THER/PROPH/DIAG INJ IV PUSH: CPT

## 2024-11-05 PROCEDURE — 96375 TX/PRO/DX INJ NEW DRUG ADDON: CPT

## 2024-11-05 PROCEDURE — 25010000002 ONDANSETRON PER 1 MG: Performed by: PHYSICIAN ASSISTANT

## 2024-11-05 RX ORDER — HYDROCODONE BITARTRATE AND ACETAMINOPHEN 5; 325 MG/1; MG/1
1 TABLET ORAL EVERY 6 HOURS PRN
Qty: 8 TABLET | Refills: 0 | Status: SHIPPED | OUTPATIENT
Start: 2024-11-05 | End: 2024-11-07

## 2024-11-05 RX ORDER — ONDANSETRON 4 MG/1
4 TABLET, ORALLY DISINTEGRATING ORAL EVERY 6 HOURS PRN
Qty: 12 TABLET | Refills: 0 | Status: SHIPPED | OUTPATIENT
Start: 2024-11-05

## 2024-11-05 RX ORDER — TAMSULOSIN HYDROCHLORIDE 0.4 MG/1
1 CAPSULE ORAL DAILY
Qty: 30 CAPSULE | Refills: 0 | Status: SHIPPED | OUTPATIENT
Start: 2024-11-05

## 2024-11-05 RX ORDER — ONDANSETRON 2 MG/ML
4 INJECTION INTRAMUSCULAR; INTRAVENOUS ONCE
Status: COMPLETED | OUTPATIENT
Start: 2024-11-05 | End: 2024-11-05

## 2024-11-05 RX ORDER — SODIUM CHLORIDE 9 MG/ML
10 INJECTION, SOLUTION INTRAMUSCULAR; INTRAVENOUS; SUBCUTANEOUS AS NEEDED
Status: DISCONTINUED | OUTPATIENT
Start: 2024-11-05 | End: 2024-11-05 | Stop reason: HOSPADM

## 2024-11-05 RX ORDER — TAMSULOSIN HYDROCHLORIDE 0.4 MG/1
0.4 CAPSULE ORAL ONCE
Status: COMPLETED | OUTPATIENT
Start: 2024-11-05 | End: 2024-11-05

## 2024-11-05 RX ADMIN — ONDANSETRON 4 MG: 2 INJECTION INTRAMUSCULAR; INTRAVENOUS at 15:13

## 2024-11-05 RX ADMIN — MORPHINE SULFATE 4 MG: 4 INJECTION, SOLUTION INTRAMUSCULAR; INTRAVENOUS at 15:13

## 2024-11-05 RX ADMIN — TAMSULOSIN HYDROCHLORIDE 0.4 MG: 0.4 CAPSULE ORAL at 15:40

## 2024-11-05 NOTE — DISCHARGE INSTRUCTIONS
Take medications as prescribed for pain and nausea as you pass the kidney stone.  Do not start Flomax until tomorrow as you have been given a dose during your visit.  Strain urine to assess for passage of stone.  Close outpatient follow-up with urology and PCP.  Call office to schedule appointment.  Please return to nearest ED immediately for any fevers, uncontrolled pain, uncontrolled nausea or vomiting, any other worsening symptoms or concerns.

## 2024-11-05 NOTE — FSED PROVIDER NOTE
Subjective  History of Present Illness:    54-year-old female PMH DM, HTN, kidney stones presents to ED for evaluation of right flank pain.  Patient states that pain began last night to right flank, sharp and radiates towards groin.  Associated nausea without any emesis.  No fevers.  She denies any dysuria but has had some dark-colored urine that patient presumes to be blood.  Feels similar to previous episodes of kidney stones.      Nurses Notes reviewed and agree, including vitals, allergies, social history and prior medical history.     REVIEW OF SYSTEMS: All systems reviewed and not pertinent unless noted.  Review of Systems   Genitourinary:  Positive for flank pain and hematuria. Negative for frequency.   All other systems reviewed and are negative.      Past Medical History:   Diagnosis Date    Arthritis     neck    Depression     Diabetes mellitus 2007    Controlled with medications-po and insulin; checks sugars at home daily-running 100-120    Gout     Headache     History of kidney stones     numerous-most passed and 4 extractions     History of stomach ulcers 2000    Hyperlipidemia     Controlled with medications    Hypertension     Left arm pain     due to neck     Neck pain     PONV (postoperative nausea and vomiting)     after hysterectomy     Retinopathy     due to diabetes mellitus-Abstracted from [facility]    Wears glasses     Well controlled type 2 diabetes mellitus        Allergies:    Amoxicillin, Levofloxacin, and Chloraprep one step [chlorhexidine gluconate]      Past Surgical History:   Procedure Laterality Date    ANTERIOR CERVICAL DISCECTOMY W/ FUSION Left 05/09/2018    Procedure: CERVICAL DISCECTOMY ANTERIOR WITH FUSION C5-6 LEFT;  Surgeon: Dawood Avendano MD;  [facility];  Service: Neurosurgery    CHOLECYSTECTOMY      COLONOSCOPY  2000    ENDOSCOPY      HYSTERECTOMY  06/2016    with bso     KIDNEY STONE SURGERY      x4    NISSEN FUNDOPLICATION      REDUCTION MAMMAPLASTY Bilateral  "11/2019         Social History     Socioeconomic History    Marital status:    Tobacco Use    Smoking status: Never    Smokeless tobacco: Never   Vaping Use    Vaping status: Never Used   Substance and Sexual Activity    Alcohol use: No    Drug use: No    Sexual activity: Yes     Partners: Male     Birth control/protection: Post-menopausal         Family History   Problem Relation Age of Onset    Cancer Father         Colon Ca    Diabetes Father     Aneurysm Paternal Grandmother         Stomach Az       Objective  Physical Exam:  /77   Pulse 73   Temp 98.9 °F (37.2 °C) (Oral)   Resp 18   Ht 165.1 cm (65\")   Wt 78.9 kg (174 lb)   SpO2 96%   BMI 28.96 kg/m²      Physical Exam  Vitals and nursing note reviewed.   Constitutional:       General: She is not in acute distress.  HENT:      Head: Normocephalic and atraumatic.   Cardiovascular:      Rate and Rhythm: Normal rate and regular rhythm.   Pulmonary:      Effort: Pulmonary effort is normal. No respiratory distress.      Breath sounds: Normal breath sounds.   Abdominal:      Palpations: Abdomen is soft.      Tenderness: There is no abdominal tenderness. There is right CVA tenderness. There is no guarding or rebound.   Musculoskeletal:         General: Normal range of motion.   Skin:     General: Skin is warm and dry.   Neurological:      Mental Status: She is alert.      Comments: Awake and alert   Psychiatric:         Mood and Affect: Mood normal.         Behavior: Behavior normal.         Procedures    ED Course:         Lab Results (last 24 hours)       Procedure Component Value Units Date/Time    CBC & Differential [[id number]]  (Normal) Collected: 11/05/24 1509    Specimen: Blood Updated: 11/05/24 1516    Narrative:      The following orders were created for panel order CBC & Differential.  Procedure                               Abnormality         Status                     ---------                               -----------         ------   "                   CBC Auto Differential[[id number]]        Normal              Final result                 Please view results for these tests on the individual orders.    Comprehensive Metabolic Panel [[id number]]  (Abnormal) Collected: 11/05/24 1509    Specimen: Blood Updated: 11/05/24 1532     Glucose 183 mg/dL      BUN 16 mg/dL      Creatinine 0.80 mg/dL      Sodium 140 mmol/L      Potassium 4.1 mmol/L      Chloride 104 mmol/L      CO2 23.9 mmol/L      Calcium 9.3 mg/dL      Total Protein 7.0 g/dL      Albumin 4.4 g/dL      ALT (SGPT) 19 U/L      AST (SGOT) 26 U/L      Alkaline Phosphatase 83 U/L      Total Bilirubin 0.3 mg/dL      Globulin 2.6 gm/dL      A/G Ratio 1.7 g/dL      BUN/Creatinine Ratio 20.0     Anion Gap 12.1 mmol/L      eGFR 88.2 mL/min/1.73     Narrative:      GFR Normal >60  Chronic Kidney Disease <60  Kidney Failure <15      Lipase [[id number]]  (Normal) Collected: 11/05/24 1509    Specimen: Blood Updated: 11/05/24 1531     Lipase 21 U/L     Urinalysis With Microscopic If Indicated (No Culture) - Urine, Clean Catch [[id number]]  (Abnormal) Collected: 11/05/24 1509    Specimen: Urine, Clean Catch Updated: 11/05/24 1515     Color, UA Yellow     Appearance, UA Cloudy     pH, UA 5.5     Specific Gravity, UA >=1.030     Glucose, UA Negative     Ketones, UA Trace     Bilirubin, UA Negative     Blood, UA Large (3+)     Protein, UA Trace     Leuk Esterase, UA Negative     Nitrite, UA Negative     Urobilinogen, UA 0.2 E.U./dL    Lactic Acid, Plasma [[id number]]  (Normal) Collected: 11/05/24 1509    Specimen: Blood Updated: 11/05/24 1530     Lactate 1.7 mmol/L     CBC Auto Differential [[id number]]  (Normal) Collected: 11/05/24 1509    Specimen: Blood Updated: 11/05/24 1516     WBC 6.28 10*3/mm3      RBC 4.49 10*6/mm3      Hemoglobin 13.3 g/dL      Hematocrit 39.5 %      MCV 88.0 fL      MCH 29.6 pg      MCHC 33.7 g/dL      RDW 12.6 %      RDW-SD 41.2 fl      MPV 8.9 fL      Platelets 268 10*3/mm3       Neutrophil % 46.8 %      Lymphocyte % 43.3 %      Monocyte % 6.7 %      Eosinophil % 2.5 %      Basophil % 0.5 %      Immature Grans % 0.2 %      Neutrophils, Absolute 2.94 10*3/mm3      Lymphocytes, Absolute 2.72 10*3/mm3      Monocytes, Absolute 0.42 10*3/mm3      Eosinophils, Absolute 0.16 10*3/mm3      Basophils, Absolute 0.03 10*3/mm3      Immature Grans, Absolute 0.01 10*3/mm3     Urinalysis, Microscopic Only - Urine, Clean Catch [[id number]]  (Abnormal) Collected: 11/05/24 1509    Specimen: Urine, Clean Catch Updated: 11/05/24 1532     RBC, UA Too Numerous to Count /HPF      WBC, UA 0-2 /HPF      Bacteria, UA Trace /HPF      Squamous Epithelial Cells, UA 0-2 /HPF      Hyaline Casts, UA None Seen /LPF      Amorphous Crystals, UA Small/1+ /HPF      Methodology Manual Light Microscopy             CT Abdomen Pelvis Without Contrast    Result Date: 11/5/2024  CT ABDOMEN PELVIS WO CONTRAST Date of Exam: 11/5/2024 3:17 PM EST Indication: R flank pain. Comparison: None available. Technique: Axial CT images were obtained of the abdomen and pelvis without the administration of contrast. Reconstructed coronal and sagittal images were also obtained. Automated exposure control and iterative construction methods were used. Findings: Lower Chest: Lung bases clear Organs: 4 mm right UVJ stone resulting in moderate hydroureteronephrosis. Bilateral renal calculi up to 3 mm on the left. Liver, spleen, pancreas, adrenal glands have an unremarkable noncontrast appearance. Gallbladder surgically absent Gastrointestinal: No acute abnormality. No intestinal distention or evident wall thickening Pelvis: No abnormal fluid collection. Uterus surgically absent. Urinary bladder grossly unremarkable Peritoneum/Retroperitoneum: No ascites or pneumoperitoneum. Normal caliber aorta. Retroaortic left renal vein Bones/Soft Tissues: No acute bony abnormality. Degenerative disc disease at L5-S1. Facet arthropathy at L4-L5     Impression: 1. 4 mm  right UVJ stone with moderate obstructive uropathy 2. Bilateral nephrolithiasis Electronically Signed: Chapin Burris  11/5/2024 3:29 PM EST  Workstation ID: [id number]        [facility]      Initial impression of presenting illness: 54-year-old female presents ED for evaluation of right flank pain.  Refer to [facility] for further details    DDX: includes but is not limited to: Kidney stone, pyelonephritis, UTI, appendicitis    Patient arrives afebrile with stable vitals interpreted by myself.     Pertinent features from physical exam: Mild right flank tenderness to palpation.  Abdomen soft and nontender without any rebound or guarding.    Initial diagnostic plan: CBC, CMP, UA, CT abdomen pelvis without contrast    Results from initial plan were reviewed and interpreted by me revealing CBC and CMP are unremarkable.  Patient does have hematuria upon urinalysis.  CT imaging consistent with 4 mm stone to right UVJ with hydroureteronephrosis.    Interventions: Medications administered as below    Medications   Sodium Chloride (PF) 0.9 % 10 mL (has no administration in time range)   ondansetron (ZOFRAN) injection 4 mg (4 mg Intravenous Given 11/5/24 1513)   morphine injection 4 mg (4 mg Intravenous Given 11/5/24 1513)   tamsulosin (FLOMAX) 24 hr capsule 0.4 mg (0.4 mg Oral Given 11/5/24 1540)       Reevaluation: Upon reevaluation patient reports significant improvement in pain now with 3 out of 10 compared to 7 out of 10 upon arrival following morphine.  Discussed all results as above with patient.  Will discharge her home with pain control, Flomax, Zofran and urine strainers with outpatient urology referral.  Strict return precautions given for any fevers, worsening pain, any other worsening symptoms or concerns.    -----  ED Disposition       ED Disposition   Discharge    Condition   Stable    Comment   --             Final diagnoses:   Calculus of ureterovesical junction (UVJ)      Your Follow-Up Providers       Go to  [facility] [place].    Specialty: Emergency Medicine  Follow up details: If symptoms worsen  [address]  [facility] [address] [phone]                     Contact information for after-discharge care    Follow-up information has not been specified.                    Your medication list        START taking these medications        Instructions Last Dose Given Next Dose Due   ondansetron ODT 4 MG disintegrating tablet  Commonly known as: ZOFRAN-ODT      Place 1 tablet on the tongue Every 6 (Six) Hours As Needed for Nausea or Vomiting.       tamsulosin 0.4 MG capsule 24 hr capsule  Commonly known as: FLOMAX      Take 1 capsule by mouth Daily.              CONTINUE taking these medications        Instructions Last Dose Given Next Dose Due   BD Pen Needle Patsy U/F 32G X 4 MM misc  Generic drug: Insulin Pen Needle      USE AS DIRECTED FOUR TIMES A DAY       FreeStyle Nahun 2 Sensor misc      Use 1 kit Every 14 (Fourteen) Days.       HumaLOG KwikPen 100 UNIT/ML solution pen-injector  Generic drug: Insulin Lispro (1 Unit Dial)      INJECT 15 UNITS UNDER THE SKIN THREE TIMES A DAY WITH MEALS       Insulin Glargine 100 UNIT/ML injection pen  Commonly known as: LANTUS SOLOSTAR      Inject 50 Units under the skin into the appropriate area as directed Daily.       rosuvastatin 5 MG tablet  Commonly known as: Crestor      1 po daily                 Where to Get Your Medications        These medications were sent to [facility] [place]  [address], [address], [facility] [address]      Hours: Monday to Friday 9 AM to 5:30 PM Phone: [phone]   ondansetron ODT 4 MG disintegrating tablet  tamsulosin 0.4 MG capsule 24 hr capsule

## 2024-11-11 ENCOUNTER — OFFICE VISIT (OUTPATIENT)
Age: 54
End: 2024-11-11
Payer: COMMERCIAL

## 2024-11-11 VITALS — OXYGEN SATURATION: 94 % | HEART RATE: 86 BPM | DIASTOLIC BLOOD PRESSURE: 81 MMHG | SYSTOLIC BLOOD PRESSURE: 125 MMHG

## 2024-11-11 DIAGNOSIS — N20.0 KIDNEY STONE: ICD-10-CM

## 2024-11-11 DIAGNOSIS — N20.0 RECURRENT NEPHROLITHIASIS: Primary | ICD-10-CM

## 2024-11-11 LAB
BILIRUB BLD-MCNC: NEGATIVE MG/DL
CLARITY, POC: CLEAR
COLOR UR: YELLOW
EXPIRATION DATE: NORMAL
GLUCOSE UR STRIP-MCNC: NEGATIVE MG/DL
KETONES UR QL: NEGATIVE
LEUKOCYTE EST, POC: NEGATIVE
Lab: NORMAL
NITRITE UR-MCNC: NEGATIVE MG/ML
PH UR: 6 [PH] (ref 5–8)
PROT UR STRIP-MCNC: NEGATIVE MG/DL
RBC # UR STRIP: NEGATIVE /UL
SP GR UR: 1.02 (ref 1–1.03)
UROBILINOGEN UR QL: NORMAL

## 2024-11-11 PROCEDURE — 82365 CALCULUS SPECTROSCOPY: CPT | Performed by: STUDENT IN AN ORGANIZED HEALTH CARE EDUCATION/TRAINING PROGRAM

## 2024-11-11 PROCEDURE — 81003 URINALYSIS AUTO W/O SCOPE: CPT | Performed by: STUDENT IN AN ORGANIZED HEALTH CARE EDUCATION/TRAINING PROGRAM

## 2024-11-11 PROCEDURE — 99204 OFFICE O/P NEW MOD 45 MIN: CPT | Performed by: STUDENT IN AN ORGANIZED HEALTH CARE EDUCATION/TRAINING PROGRAM

## 2024-11-11 RX ORDER — POTASSIUM CITRATE 15 MEQ/1
15 TABLET, EXTENDED RELEASE ORAL 2 TIMES DAILY
Qty: 200 TABLET | Refills: 5 | Status: SHIPPED | OUTPATIENT
Start: 2024-11-11

## 2024-11-11 NOTE — PROGRESS NOTES
Office Note Kidney Stone      Patient Name: Pricilla Senior  : 1970   MRN: [id number]     Chief Complaint: History of Kidney Stones.    Chief Complaint   Patient presents with    Flank Pain    Nephrolithiasis       Referring Provider: Jagruti Tovar PA-C    History of Present Illness: Pricilla Senior is a 54 y.o. female who presents today for history of Kidney Stones.  Patient was seen in the ER last week for a small distal right ureteral stone.  She subsequently passed this.  Patient states that she has passed multiple stones over her adult life.    Has had recurrent stones, was previously seen at [facility].  Previously saw a different urology provider.  Has undergone previous shockwave lithotripsy in addition to ureteroscopy and lithotripsy with stent placement.  This was performed remotely.    Only drinks 1 liter water per day, states that she typically eats animal protein based meals.  She tries to eat a good amount of vegetables but avoids fruits due to her diabetes.    Reviewed CT scan.  She has a small 1 or 2 mm stone in the right lower pole.  On the left kidney she has numerous 4 or 5 small 1 or 2 mm stones as well.      Subjective      Review of System: Review of Systems   Genitourinary:  Positive for flank pain.      I have reviewed the ROS documented by my clinical staff, I have updated appropriately and I agree. Ruiz James MD    Past Medical History:   Past Medical History:   Diagnosis Date    Arthritis     neck    Depression     Diabetes mellitus     Controlled with medications-po and insulin; checks sugars at home daily-running 100-120    Gout     Headache     History of kidney stones     numerous-most passed and 4 extractions     History of stomach ulcers     Hyperlipidemia     Controlled with medications    Hypertension     Kidney stone 1997    Left arm pain     due to neck     Neck pain     PONV (postoperative nausea and vomiting)     after  hysterectomy     Retinopathy     due to diabetes mellitus-Abstracted from [facility]    Urinary tract infection 1990    Wears glasses     Well controlled type 2 diabetes mellitus        Past Surgical History:   Past Surgical History:   Procedure Laterality Date    ANTERIOR CERVICAL DISCECTOMY W/ FUSION Left 05/09/2018    Procedure: CERVICAL DISCECTOMY ANTERIOR WITH FUSION C5-6 LEFT;  Surgeon: Dawood Avednano MD;  Location: [facility];  Service: Neurosurgery    CHOLECYSTECTOMY      COLONOSCOPY  2000    ENDOSCOPY      HYSTERECTOMY  06/2016    with bso     KIDNEY STONE SURGERY      x4    LITHOTRIPSY  1997    NISSEN FUNDOPLICATION      REDUCTION MAMMAPLASTY Bilateral 11/2019    TUBAL ABDOMINAL LIGATION  2000       Family History:   Family History   Problem Relation Age of Onset    Cancer Father         Colon Ca    Diabetes Father     Hypertension Father     Aneurysm Paternal Grandmother         Stomach Az       Social History:   Social History     Socioeconomic History    Marital status:    Tobacco Use    Smoking status: Never    Smokeless tobacco: Never   Vaping Use    Vaping status: Never Used   Substance and Sexual Activity    Alcohol use: No    Drug use: No    Sexual activity: Yes     Partners: Male     Birth control/protection: Post-menopausal       Medications:     Current Outpatient Medications:     Continuous Blood Gluc Sensor (FreeStyle Nahun 2 Sensor) misc, Use 1 kit Every 14 (Fourteen) Days., Disp: 2 each, Rfl: 11    HumaLOG KwikPen 100 UNIT/ML solution pen-injector, INJECT 15 UNITS UNDER THE SKIN THREE TIMES A DAY WITH MEALS, Disp: 45 mL, Rfl: 3    Insulin Glargine (LANTUS SOLOSTAR) 100 UNIT/ML injection pen, Inject 50 Units under the skin into the appropriate area as directed Daily., Disp: 45 mL, Rfl: 3    Insulin Pen Needle (BD Pen Needle Patsy U/F) 32G X 4 MM misc, USE AS DIRECTED FOUR TIMES A DAY, Disp: 400 each, Rfl: 1    ondansetron ODT (ZOFRAN-ODT) 4 MG disintegrating tablet, Place 1 tablet on  the tongue Every 6 (Six) Hours As Needed for Nausea or Vomiting., Disp: 12 tablet, Rfl: 0    rosuvastatin (Crestor) 5 MG tablet, 1 po daily, Disp: 30 tablet, Rfl: 11    tamsulosin (FLOMAX) 0.4 MG capsule 24 hr capsule, Take 1 capsule by mouth Daily., Disp: 30 capsule, Rfl: 0    Potassium Citrate ER 15 MEQ (1620 MG) tablet controlled-release, Take 15 mEq by mouth 2 (Two) Times a Day., Disp: 200 tablet, Rfl: 5    Allergies:   Allergies   Allergen Reactions    Amoxicillin Unknown (See Comments)    Levofloxacin Unknown (See Comments)    Chloraprep One Step [Chlorhexidine Gluconate] Rash       Bladder & Bowel Symptom Questionnaire    How often do you usually urinate during the day ?   1 - About every 3-4 hours   2.   How many timed do you urinate at night?   1 - 2 times at night   3.   What is the reason that you usually urinate?   2 - Moderate urge (can delay 10-60 min)   4.   Once you get the urge to go, how long can you     comfortably delay?   2 - 10-30 min   5.   How often do you get a sudden urge that makes you rush to the bathroom?   2 - A few times a month   6.   How often does a sudden urge to urinate result in you leaking urine or wetting pads?   2 - A few times a month   7.  In your opinion, how good is your bladder control?   2 - Good   8.  Do you have accidental bowel leakage?   no   9.  Do you have difficulty fully emptying your bladder?   no   10.  Do you experience accidental leakage when performing some physical activity such as coughing, sneezing, laughing or exercise?   yes   11. Have you tried medications to help improve your symptoms?   no   12. Would you be interested in learning about a long-lasting option that may help you with your symptoms?   no                                                                             Total Score   12     0-7 (Mild) 8-16 (Moderate) 17-28 (Severe)          Objective     Physical Exam:   Vital Signs:   Vitals:    11/11/24 0847   BP: 125/81   Pulse: 86   SpO2:  94%     There is no height or weight on file to calculate BMI.     Physical Exam  Constitutional:       Appearance: Normal appearance.   HENT:      Head: Normocephalic and atraumatic.      Nose: Nose normal.      Mouth/Throat:      Mouth: Mucous membranes are moist.      Pharynx: Oropharynx is clear.   Eyes:      Extraocular Movements: Extraocular movements intact.      Pupils: Pupils are equal, round, and reactive to light.   Pulmonary:      Effort: Pulmonary effort is normal. No respiratory distress.   Musculoskeletal:         General: No swelling or deformity. Normal range of motion.      Cervical back: Normal range of motion and neck supple.   Skin:     General: Skin is warm and dry.   Neurological:      General: No focal deficit present.      Mental Status: She is alert and oriented to person, place, and time. Mental status is at baseline.   Psychiatric:         Mood and Affect: Mood normal.         Behavior: Behavior normal.         Labs:   Brief Urine Lab Results  (Last result in the past 365 days)        Color   Clarity   Blood   Leuk Est   Nitrite   Protein   CREAT   Urine HCG        11/11/24 0854 Yellow   Clear   Negative   Negative   Negative   Negative                        Color, UA   Date Value Ref Range Status   11/05/2024 Yellow Yellow, Straw Final     Color   Date Value Ref Range Status   11/11/2024 Yellow Yellow, Straw, Dark Yellow, Shantal Final     Bilirubin, UA   Date Value Ref Range Status   11/05/2024 Negative Negative Final     Bilirubin   Date Value Ref Range Status   11/11/2024 Negative Negative Final     Urobilinogen, UA   Date Value Ref Range Status   11/11/2024 Normal Normal, 0.2 E.U./dL Final   11/05/2024 0.2 E.U./dL 0.2 - 1.0 E.U./dL Final     Blood, UA   Date Value Ref Range Status   11/11/2024 Negative Negative Final     Leuk Esterase, UA   Date Value Ref Range Status   11/05/2024 Negative Negative Final     Leukocytes   Date Value Ref Range Status   11/11/2024 Negative Negative Final  "      Lab Results   Component Value Date    GLUCOSE 183 (H) 11/05/2024    CALCIUM 9.3 11/05/2024     11/05/2024    K 4.1 11/05/2024    CO2 23.9 11/05/2024     11/05/2024    BUN 16 11/05/2024    CREATININE 0.80 11/05/2024    EGFRIFNONA 77 05/04/2018    BCR 20.0 11/05/2024    ANIONGAP 12.1 11/05/2024       Lab Results   Component Value Date    WBC 6.28 11/05/2024    HGB 13.3 11/05/2024    HCT 39.5 11/05/2024    MCV 88.0 11/05/2024     11/05/2024       Stone Analysis  No components found for: \"HOMKI4RZSLXF\", \"NWFEL0MEKKBI\", \"ANBQF6WJYMNQ\"    Images:   CT Abdomen Pelvis Without Contrast    Result Date: 11/5/2024  1. 4 mm right UVJ stone with moderate obstructive uropathy 2. Bilateral nephrolithiasis Electronically Signed: Chapin Burris  11/5/2024 3:29 PM EST  Workstation ID: [id number]      Measures:   Tobacco:   Pricilla Senior  reports that she has never smoked. She has never used smokeless tobacco.    Assessment / Plan      Assessment/Plan:   Pricilla Senior is a 54 y.o. female who presented today for kidney stones.  Patient passed a right ureteral stone.  Reviewed CT scan with the patient.  She has at least 4 or 5 small 1 or 2 mm stones in the left kidney.  These are too small to warrant surgical intervention.  These would likely pass naturally.  She has a tiny 1 or 2 mm stone in the right lower pole as well.  We will plan for repeat imaging within a year.  I recommend a [facility] for metabolic testing.  We discussed kidney stone preventative strategies.  Needs to increase water, reduce animal protein, reduce sodium intake, increase in intake of fruits and vegetable.  She will need to start potassium citrate given numerous recurrent stone episodes for prevention, risks discussed    Diagnoses and all orders for this visit:    1. Recurrent nephrolithiasis (Primary)  -     Potassium Citrate ER 15 MEQ (1620 MG) tablet controlled-release; Take 15 mEq by mouth 2 (Two) Times a Day.  Dispense: 200 " tablet; Refill: 5    2. Kidney stone  -     POC Urinalysis Dipstick, Automated  -     STONE ANALYSIS - Calculus,; Future  -     STONE ANALYSIS - Calculus,         Patient Education:   1. Fluid intake goal ~ 2.5L per day.    Follow Up:   Return in about 3 months (around 2/11/2025).    I spent approximately 45 minutes providing clinical care for this patient; including review of patient's chart and provider documentation, face to face time spent with patient in examination room (obtaining history, performing physical exam, discussing diagnosis and management options), placing orders, and completing patient documentation.     Ruiz James MD  [facility] [place]

## 2024-11-15 ENCOUNTER — PATIENT ROUNDING (BHMG ONLY) (OUTPATIENT)
Dept: UROLOGY | Facility: CLINIC | Age: 54
End: 2024-11-15
Payer: COMMERCIAL

## 2024-11-15 NOTE — PROGRESS NOTES
A Sevence message has been sent to the patient for PATIENT ROUNDING with [facility].

## 2024-11-16 LAB
COLOR STONE: NORMAL
COM MFR STONE: 100 %
COMPN STONE: NORMAL
LABORATORY COMMENT REPORT: NORMAL
Lab: NORMAL
Lab: NORMAL
PHOTO: NORMAL
SIZE STONE: NORMAL MM
SPEC SOURCE SUBJ: NORMAL
WT STONE: 21 MG

## 2024-11-28 ENCOUNTER — HOSPITAL ENCOUNTER (EMERGENCY)
Facility: HOSPITAL | Age: 54
Discharge: HOME | End: 2024-11-28
Attending: EMERGENCY MEDICINE | Admitting: EMERGENCY MEDICINE
Payer: COMMERCIAL

## 2024-11-28 VITALS
DIASTOLIC BLOOD PRESSURE: 46 MMHG | RESPIRATION RATE: 21 BRPM | SYSTOLIC BLOOD PRESSURE: 114 MMHG | OXYGEN SATURATION: 93 % | HEART RATE: 79 BPM | WEIGHT: 173.1 LBS | TEMPERATURE: 99.7 F | HEIGHT: 63 IN | BODY MASS INDEX: 30.67 KG/M2

## 2024-11-28 DIAGNOSIS — J06.9 UPPER RESPIRATORY TRACT INFECTION, UNSPECIFIED TYPE: Primary | ICD-10-CM

## 2024-11-28 LAB
FLUAV RNA RESP QL NAA+PROBE: NOT DETECTED
FLUBV RNA RESP QL NAA+PROBE: NOT DETECTED
RSV RNA RESP QL NAA+PROBE: NOT DETECTED
SARS-COV-2 RNA RESP QL NAA+PROBE: NOT DETECTED

## 2024-11-28 PROCEDURE — 87637 SARSCOV2&INF A&B&RSV AMP PRB: CPT | Performed by: EMERGENCY MEDICINE

## 2024-11-28 PROCEDURE — 99283 EMERGENCY DEPT VISIT LOW MDM: CPT

## 2024-11-28 NOTE — FSED PROVIDER NOTE
Subjective   History of Present Illness  Patient presents to the emergency department for cough body aches and chills.  Symptoms began overnight.  She has not taken her temperature but says she feels somewhat warm.  Cough has been mostly nonproductive.  No known sick contacts.  She denies any chest pain shortness of breath abdominal pain dysuria or any other symptoms.    History provided by:  Patient   used: No        Review of Systems   Constitutional:  Positive for chills and fatigue.   Respiratory:  Positive for cough.    All other systems reviewed and are negative.      Past Medical History:   Diagnosis Date    Arthritis     neck    Depression     Diabetes mellitus 2007    Controlled with medications-po and insulin; checks sugars at home daily-running 100-120    Gout     Headache     History of kidney stones     numerous-most passed and 4 extractions     History of stomach ulcers 2000    Hyperlipidemia     Controlled with medications    Hypertension     Kidney stone Jan 1997    Left arm pain     due to neck     Neck pain     PONV (postoperative nausea and vomiting)     after hysterectomy     Retinopathy     due to diabetes mellitus-Abstracted from [facility]    Urinary tract infection 1990    Wears glasses     Well controlled type 2 diabetes mellitus        Allergies   Allergen Reactions    Amoxicillin Unknown (See Comments)    Levofloxacin Unknown (See Comments)    Chloraprep One Step [Chlorhexidine Gluconate] Rash       Past Surgical History:   Procedure Laterality Date    ANTERIOR CERVICAL DISCECTOMY W/ FUSION Left 05/09/2018    Procedure: CERVICAL DISCECTOMY ANTERIOR WITH FUSION C5-6 LEFT;  Surgeon: Dawood Avendano MD;  Location: [facility];  Service: Neurosurgery    CHOLECYSTECTOMY      COLONOSCOPY  2000    ENDOSCOPY      HYSTERECTOMY  06/2016    with bso     KIDNEY STONE SURGERY      x4    LITHOTRIPSY  1997    NISSEN FUNDOPLICATION      REDUCTION MAMMAPLASTY Bilateral 11/2019    TUBAL  ABDOMINAL LIGATION  2000       Family History   Problem Relation Age of Onset    Cancer Father         Colon Ca    Diabetes Father     Hypertension Father     Aneurysm Paternal Grandmother         Stomach Az       Social History     Socioeconomic History    Marital status:    Tobacco Use    Smoking status: Never    Smokeless tobacco: Never   Vaping Use    Vaping status: Never Used   Substance and Sexual Activity    Alcohol use: No    Drug use: No    Sexual activity: Yes     Partners: Male     Birth control/protection: Post-menopausal           Objective   Physical Exam  Vitals and nursing note reviewed.   Constitutional:       Appearance: Normal appearance.   HENT:      Head: Normocephalic and atraumatic.      Nose: Nose normal.      Mouth/Throat:      Mouth: Mucous membranes are moist.      Pharynx: Oropharynx is clear.   Eyes:      Extraocular Movements: Extraocular movements intact.      Pupils: Pupils are equal, round, and reactive to light.   Cardiovascular:      Rate and Rhythm: Normal rate and regular rhythm.      Pulses: Normal pulses.   Pulmonary:      Effort: Pulmonary effort is normal.      Breath sounds: Normal breath sounds.   Abdominal:      General: Abdomen is flat. There is no distension.      Tenderness: There is no abdominal tenderness. There is no guarding or rebound.   Musculoskeletal:         General: No swelling, deformity or signs of injury. Normal range of motion.      Cervical back: Normal range of motion and neck supple.   Skin:     General: Skin is warm and dry.      Capillary Refill: Capillary refill takes less than 2 seconds.   Neurological:      General: No focal deficit present.      Mental Status: She is alert and oriented to person, place, and time.      Cranial Nerves: No cranial nerve deficit.   Psychiatric:         Mood and Affect: Mood normal.         Behavior: Behavior normal.         Procedures           ED Course                                           Medical  Decision Making  Hemodynamically stable and afebrile.  Patient is nontoxic-appearing.  Oxygen saturations are within normal limits.  Chest exam is clear.  COVID and flu are negative.  She is otherwise well-appearing diagnosis upper respiratory tract infection.  Given return precautions care instructions and discharged    Problems Addressed:  Upper respiratory tract infection, unspecified type: acute illness or injury    Amount and/or Complexity of Data Reviewed  Labs: ordered. Decision-making details documented in ED Course.        Final diagnoses:   Upper respiratory tract infection, unspecified type       ED Disposition  ED Disposition       ED Disposition   Discharge    Condition   Stable    Comment   --               [facility] EMERGENCY DEPARTMENT [place]  [address]  [place] [facility] [address] [phone]  Schedule an appointment as soon as possible for a visit   As needed         Medication List      No changes were made to your prescriptions during this visit.

## 2024-12-30 NOTE — TELEPHONE ENCOUNTER
Health Maintenance       Medicare Advantage- Medicare Wellness Visit (Yearly - January to December)  Overdue since 1/1/2024    COVID-19 Vaccine (4 - 2024-25 season)  Overdue since 9/1/2024           Following review of the above:  Patient is not proceeding with:   and COVID-19    Note: Refer to final orders and clinician documentation.       Patient called and states that she has not heard back from TESSA Pozo.    Please contact patient at your earliest convenience.

## 2025-01-16 LAB
AMMONIA 24H UR-SRATE: 24 MMOL/24 HR (ref 15–60)
CA H2 PHOS DIHYD 24H SATFR UR: 1.62 (ref 0.5–2)
CALCIUM 24H UR-MRATE: 221 MG/24 HR
CALCIUM/CREAT 24H UR: 178 MG/G CREAT (ref 51–262)
CALCIUM/KG BODY WEIGHT: 2.8 MG/24 HR/KG
CAOX INDEX 24H UR-RTO: 9.05 (ref 6–10)
CHLORIDE 24H UR-SRATE: 246 MMOL/24 HR (ref 70–250)
CITRATE 24H UR-MRATE: 1034 MG/24 HR
CREAT 24H UR-MRATE: 1240 MG/24 HR
CREAT/BW 24H UR-RELMRAT: 15.6 MG/24 HR/KG (ref 8.7–20.3)
CYSTINE 24H UR QL: ABNORMAL
LABORATORY COMMENT REPORT: ABNORMAL
MAGNESIUM 24H UR-MRATE: 86 MG/24 HR (ref 30–120)
OXALATE 24H UR-MRATE: 35 MG/24 HR (ref 20–40)
PH 24H UR: 6.09 [PH] (ref 5.8–6.2)
PHOSPHATE 24H UR-MRATE: 838 MG/24 HR (ref 600–1200)
POTASSIUM 24H UR-SRATE: 90 MMOL/24 HR (ref 20–100)
PROTEIN CATABOLIC RATE 24H UR-MRATE: 0.9 G/KG/24 HR (ref 0.8–1.4)
SODIUM 24H UR-SRATE: 229 MMOL/24 HR (ref 50–150)
SPECIMEN VOL 24H UR: 1290 ML/24 HR (ref 500–4000)
SULFATE 24H UR-SRATE: 36 MEQ/24 HR (ref 20–80)
URATE 24H SATFR UR: 0.76
URATE 24H UR-MRATE: 597 MG/24 HR
UUN 24H UR-MRATE: 8.55 G/24 HR (ref 6–14)

## 2025-02-17 ENCOUNTER — OFFICE VISIT (OUTPATIENT)
Age: 55
End: 2025-02-17
Payer: COMMERCIAL

## 2025-02-17 DIAGNOSIS — N20.0 RECURRENT NEPHROLITHIASIS: Primary | ICD-10-CM

## 2025-02-17 PROCEDURE — 99213 OFFICE O/P EST LOW 20 MIN: CPT | Performed by: STUDENT IN AN ORGANIZED HEALTH CARE EDUCATION/TRAINING PROGRAM

## 2025-02-17 NOTE — PROGRESS NOTES
Follow Up Office Visit      Patient Name: Pricilla Senior  : 1970   MRN: [id number]     Chief Complaint:    Chief Complaint   Patient presents with    Recurrent nephrolithiasis       Referring Provider: No ref. provider found    History of Present Illness: Pricilla Senior is a 54 y.o. female who presents today for follow up of recurrent nephrolithiasis.  CT scan reviewed at last visit.  Small punctate stones bilaterally.  She has had multiple kidney stone interventions with a prior urologist.  Returns today to review results of 24-hour urine study via [facility].  She was started on 15 mEq potassium citrate twice daily.  From a dietary perspective she eats pepperoni as a snack often daily, she eats fast food quite a bit and struggles to drink water.  Her 24-hour urine demonstrates she is dehydrated, her urine volume was only 1.29 L.  Her urine sodium was high, her urine calcium is high.  Her citrate levels normal    Cystine, Urine CANCELED       Comment: Test not performed. Previous test results on file.    Result canceled by the ancillary.   Urine Volume  500 - 4,000 mL/24 hr 1,290       Calcium Oxalate Saturation  6.00 - 10.00 9.05       Calcium, Urine  <200 mg/24 hr 221 High        Oxalate, Urine  20 - 40 mg/24 hr 35       Citric Acid, Urine  >550 mg/24 hr 1,034       Comment: The urine Cit result was verified by repeat analysis.   Calcium Phosphate Saturation  0.50 - 2.00 1.62       pH, 24 Hr Urine  5.800 - 6.200 6.088       Uric Acid Saturation  <1.00 0.76       Uric Acid Urine  <750 mg/24 hr 597       Sodium, urine  50 - 150 mmol/24 hr 229 High        Potassium, urine  20 - 100 mmol/24 hr 90       Magnesium, Urine  30 - 120 mg/24 hr 86       Comment: The urine Mg result was verified by repeat analysis.   Phosphorus, urine  600 - 1,200 mg/24 hr 838       Ammonium, Urine  15 - 60 mmol/24 hr 24       Chloride, Urine  70 - 250 mmol/24 hr 246       Sulfate, 24H Ur  20 - 80 meq/24 hr 36       Urea  Nitrogen, Urine  6.00 - 14.00 g/24 hr 8.55       Protein Catabolic Rate  0.8 - 1.4 g/kg/24 hr 0.9       Creatinine, Urine  Not Applic. mg/24 hr 1,240           Subjective      Review of System: Review of Systems   Genitourinary: Negative.  Negative for decreased urine volume, difficulty urinating, dysuria, enuresis, flank pain, frequency, hematuria and urgency.      I have reviewed the ROS documented by my clinical staff, I have updated appropriately and I agree. Ruiz James MD    I have reviewed and the following portions of the patient's history were updated as appropriate: past family history, past medical history, past social history, past surgical history and problem list.    Medications:     Current Outpatient Medications:     Continuous Blood Gluc Sensor (FreeStyle Nahun 2 Sensor) misc, Use 1 kit Every 14 (Fourteen) Days., Disp: 2 each, Rfl: 11    HumaLOG KwikPen 100 UNIT/ML solution pen-injector, INJECT 15 UNITS UNDER THE SKIN THREE TIMES A DAY WITH MEALS, Disp: 45 mL, Rfl: 3    Insulin Glargine (LANTUS SOLOSTAR) 100 UNIT/ML injection pen, Inject 50 Units under the skin into the appropriate area as directed Daily., Disp: 45 mL, Rfl: 3    Insulin Pen Needle (BD Pen Needle Patsy U/F) 32G X 4 MM misc, USE AS DIRECTED FOUR TIMES A DAY, Disp: 400 each, Rfl: 1    ondansetron ODT (ZOFRAN-ODT) 4 MG disintegrating tablet, Place 1 tablet on the tongue Every 6 (Six) Hours As Needed for Nausea or Vomiting., Disp: 12 tablet, Rfl: 0    Potassium Citrate ER 15 MEQ (1620 MG) tablet controlled-release, Take 1 tablet by mouth 2 (Two) Times a Day., Disp: 200 tablet, Rfl: 5    rosuvastatin (Crestor) 5 MG tablet, 1 po daily, Disp: 30 tablet, Rfl: 11    tamsulosin (FLOMAX) 0.4 MG capsule 24 hr capsule, Take 1 capsule by mouth Daily., Disp: 30 capsule, Rfl: 0    Allergies:   Allergies   Allergen Reactions    Amoxicillin Unknown (See Comments)    Levofloxacin Unknown (See Comments)    Chloraprep One Step [Chlorhexidine  Gluconate] Rash       Bladder & Bowel Symptom Questionnaire    How often do you usually urinate during the day ?   1 - About every 3-4 hours   2.   How many timed do you urinate at night?   1 - 2 times at night   3.   What is the reason that you usually urinate?   2 - Moderate urge (can delay 10-60 min)   4.   Once you get the urge to go, how long can you     comfortably delay?   1 - 30-60 min   5.   How often do you get a sudden urge that makes you rush to the bathroom?   2 - A few times a month   6.   How often does a sudden urge to urinate result in you leaking urine or wetting pads?   1 - Rarely   7.  In your opinion, how good is your bladder control?   2 - Good   8.  Do you have accidental bowel leakage?   no   9.  Do you have difficulty fully emptying your bladder?   no   10.  Do you experience accidental leakage when performing some physical activity such as coughing, sneezing, laughing or exercise?   yes   11. Have you tried medications to help improve your symptoms?   no   12. Would you be interested in learning about a long-lasting option that may help you with your symptoms?   no                                                                             Total Score   10     0-7 (Mild) 8-16 (Moderate) 17-28 (Severe)            Objective     Physical Exam:   Vital Signs: There were no vitals filed for this visit.  There is no height or weight on file to calculate BMI.     Physical Exam  Constitutional:       Appearance: Normal appearance.   HENT:      Head: Normocephalic and atraumatic.      Nose: Nose normal.      Mouth/Throat:      Mouth: Mucous membranes are moist.      Pharynx: Oropharynx is clear.   Eyes:      Extraocular Movements: Extraocular movements intact.      Pupils: Pupils are equal, round, and reactive to light.   Pulmonary:      Effort: Pulmonary effort is normal. No respiratory distress.   Musculoskeletal:         General: No swelling or deformity. Normal range of motion.      Cervical  back: Normal range of motion and neck supple.   Skin:     General: Skin is warm and dry.   Neurological:      General: No focal deficit present.      Mental Status: She is alert and oriented to person, place, and time. Mental status is at baseline.   Psychiatric:         Mood and Affect: Mood normal.         Behavior: Behavior normal.         Labs:   Brief Urine Lab Results  (Last result in the past 365 days)        Color   Clarity   Blood   Leuk Est   Nitrite   Protein   CREAT   Urine HCG        01/08/25 0730             1,240  Comment: As of 4/29/2018, the calibrator for urine creatinine has  changed. Going forward, urine creatinine values will  decrease by 17%. Reference ranges have been updated for  Cr24/Kg, Ca24/Cr24, and Cit24/Cr24. Results from the last  sample prior to the change in calibrator (collected on  10/21/2013) have been modified to assist in comparing data  over time. The original Cr24 value was reported as 1647 and  is recalculated as 1367; Cr24/Kg was 23.0 and is  recalculated as 19.1; and Ca24/Cr24 was 185 and is  recalculated as 223.                        Lab Results   Component Value Date    GLUCOSE 183 (H) 11/05/2024    CALCIUM 9.3 11/05/2024     11/05/2024    K 4.1 11/05/2024    CO2 23.9 11/05/2024     11/05/2024    BUN 16 11/05/2024    CREATININE 0.80 11/05/2024    EGFRIFNONA 77 05/04/2018    BCR 20.0 11/05/2024    ANIONGAP 12.1 11/05/2024       Lab Results   Component Value Date    WBC 6.28 11/05/2024    HGB 13.3 11/05/2024    HCT 39.5 11/05/2024    MCV 88.0 11/05/2024     11/05/2024       Images:   CT Abdomen Pelvis Without Contrast    Result Date: 11/5/2024  1. 4 mm right UVJ stone with moderate obstructive uropathy 2. Bilateral nephrolithiasis Electronically Signed: Chapin Burris  11/5/2024 3:29 PM EST  Workstation ID: [id number]      Measures:   Tobacco:   Pricilla SULEMAN Senior  reports that she has never smoked. She has never used smokeless tobacco.        Urine  Incontinence: ( NOUI)  Patient reports that she is not currently experiencing any symptoms of urinary incontinence.      Assessment / Plan      Assessment/Plan:   54 y.o. female who presented today for follow up of recurrent nephrolithiasis.  Continue potassium citrate 15 mEq twice daily.  24-hour urine demonstrates dehydration, high urine sodium, high urine calcium.  This is likely dietary associated.  I discussed she needs to reduce her sodium intake to less than 2000 mg/day.  Cut back on daily meats, increase water greater than 2 L daily.  See me back in December with a KUB prior for stone surveillance.  We will likely repeat a [facility] in the future if recurrent stones or worsening burden.  Calcium level is borderline so we will avoid thiazide diuretic at this time, favoring dietary changes at this time    Diagnoses and all orders for this visit:    1. Recurrent nephrolithiasis (Primary)  -     XR Abdomen KUB; Future           Follow Up:   Return in about 43 weeks (around 12/15/2025) for Follow Up after Imaging.    I spent approximately 20 minutes providing clinical care for this patient; including review of patient's chart and provider documentation, face to face time spent with patient in examination room (obtaining history, performing physical exam, discussing diagnosis and management options), placing orders, and completing patient documentation.     Ruiz James MD  [facility] [place]

## 2025-03-07 NOTE — TELEPHONE ENCOUNTER
Rx Refill Note  Requested Prescriptions     Pending Prescriptions Disp Refills    Insulin Glargine (LANTUS SOLOSTAR) 100 UNIT/ML injection pen 45 mL 0     Sig: Inject 50 Units under the skin into the appropriate area as directed Daily.      Last office visit with prescribing clinician: 9/23/2024     Next office visit with prescribing clinician: 3/28/2025       Sharona Ramirez MA  03/07/25, 12:24 EST

## 2025-03-28 ENCOUNTER — OFFICE VISIT (OUTPATIENT)
Age: 55
End: 2025-03-28
Payer: COMMERCIAL

## 2025-03-28 VITALS
DIASTOLIC BLOOD PRESSURE: 69 MMHG | WEIGHT: 176 LBS | HEART RATE: 78 BPM | OXYGEN SATURATION: 97 % | HEIGHT: 63 IN | BODY MASS INDEX: 31.18 KG/M2 | SYSTOLIC BLOOD PRESSURE: 117 MMHG

## 2025-03-28 DIAGNOSIS — Z79.4 TYPE 2 DIABETES MELLITUS WITH HYPOGLYCEMIA WITHOUT COMA, WITH LONG-TERM CURRENT USE OF INSULIN: Primary | ICD-10-CM

## 2025-03-28 DIAGNOSIS — E11.649 TYPE 2 DIABETES MELLITUS WITH HYPOGLYCEMIA WITHOUT COMA, WITH LONG-TERM CURRENT USE OF INSULIN: Primary | ICD-10-CM

## 2025-03-28 LAB
EXPIRATION DATE: ABNORMAL
EXPIRATION DATE: NORMAL
GLUCOSE BLDC GLUCOMTR-MCNC: 101 MG/DL (ref 70–130)
HBA1C MFR BLD: 6.5 % (ref 4.5–5.7)
Lab: ABNORMAL
Lab: NORMAL

## 2025-03-28 RX ORDER — PEN NEEDLE, DIABETIC 32GX 5/32"
NEEDLE, DISPOSABLE MISCELLANEOUS
Qty: 400 EACH | Refills: 1 | Status: SHIPPED | OUTPATIENT
Start: 2025-03-28

## 2025-03-28 RX ORDER — INSULIN LISPRO 100 [IU]/ML
INJECTION, SOLUTION INTRAVENOUS; SUBCUTANEOUS
Qty: 45 ML | Refills: 3 | Status: SHIPPED | OUTPATIENT
Start: 2025-03-28

## 2025-03-28 NOTE — PROGRESS NOTES
"     Office Note      Date: 2025  Patient Name: Pricilla Senior  MRN: [id number]  : 1970    Chief Complaint   Patient presents with    Diabetes     Type 2 diabetes mellitus with hypoglycemia without coma, with long-term current use of insulin  Weight       History of Present Illness:   Pricilla Senior is a 54 y.o. female who presents for Diabetes type 2.   Current RX BBI     Bg checks are done:with anastasiya    Hypoglycemia :occasiona severe  The last 2 weeks of anastasiya data are reviewed.  10 % are low  84 % are in range  6 % are 180-250  0 % are >250  The glycemic pattern shows: nocturnal hypoglycemia      Last A1c:  Hemoglobin A1C   Date Value Ref Range Status   2025 6.5 (A) 4.5 - 5.7 % Final   2018 8.60 (H) 4.80 - 5.60 % Final       Changes in health since last visit: went to ER with kidney stones. .    Subjective            Review of Systems:   Review of Systems   Musculoskeletal:  Positive for back pain.       The following portions of the patient's history were reviewed and updated as appropriate: allergies, current medications, past family history, past medical history, past social history, past surgical history, and problem list.    Objective     Visit Vitals  /69 (BP Location: Left arm, Patient Position: Sitting, Cuff Size: Adult)   Pulse 78   Ht 160 cm (63\")   Wt 79.8 kg (176 lb)   SpO2 97%   BMI 31.18 kg/m²           Physical Exam:  Physical Exam  Vitals reviewed.   Constitutional:       Appearance: Normal appearance.   Neurological:      Mental Status: She is alert.   Psychiatric:         Mood and Affect: Mood normal.         Behavior: Behavior normal.         Thought Content: Thought content normal.         Judgment: Judgment normal.          Assessment / Plan      Assessment & Plan:  Problem List Items Addressed This Visit       Type 2 diabetes mellitus - Primary    Current Assessment & Plan    Eyes- up to date  Kidneys-  up to date  Feet up to date    Assessment  - A1c at " goal but too many hypos. Will reduce lantus to 40          Relevant Medications    HumaLOG KwikPen 100 UNIT/ML solution pen-injector    Continuous Blood Gluc Sensor (FreeStyle Nahun 2 Sensor) misc    Insulin Glargine (LANTUS SOLOSTAR) 100 UNIT/ML injection pen    Other Relevant Orders    POC Glucose, Blood (Completed)    POC Glycosylated Hemoglobin (Hb A1C) (Completed)         Electronically signed by : Barney Todd MD  03/28/2025

## 2025-03-28 NOTE — ASSESSMENT & PLAN NOTE
Eyes- up to date  Kidneys-  up to date  Feet up to date    Assessment  - A1c at goal but too many hypos. Will reduce lantus to 40

## 2025-03-31 ENCOUNTER — PRIOR AUTHORIZATION (OUTPATIENT)
Dept: ENDOCRINOLOGY | Facility: CLINIC | Age: 55
End: 2025-03-31
Payer: COMMERCIAL

## 2025-04-01 NOTE — TELEPHONE ENCOUNTER
LEOBARDO MANNING (Key: [id number])  PA Case ID #: [id number]  Rx #: [id number]  Need Help? Call us at [phone]  Outcome  Approved on March 31 [facility]  Your PA request has been approved. Additional information will be provided in the approval communication. (Message 1147)  Effective Date: 3/31/2025  Authorization Expiration Date: 3/31/2026  Drug  FreeStyle Nahun 2 Plus Sensor  ePA cloud logo  Form  [employer] PA Form (2017 NCP[employer])

## 2025-04-30 ENCOUNTER — OFFICE VISIT (OUTPATIENT)
Age: 55
End: 2025-04-30
Payer: COMMERCIAL

## 2025-04-30 VITALS
SYSTOLIC BLOOD PRESSURE: 112 MMHG | HEART RATE: 79 BPM | DIASTOLIC BLOOD PRESSURE: 76 MMHG | BODY MASS INDEX: 31.04 KG/M2 | HEIGHT: 63 IN | WEIGHT: 175.2 LBS | OXYGEN SATURATION: 97 %

## 2025-04-30 DIAGNOSIS — G89.29 CHRONIC MIDLINE LOW BACK PAIN WITHOUT SCIATICA: ICD-10-CM

## 2025-04-30 DIAGNOSIS — M54.50 CHRONIC MIDLINE LOW BACK PAIN WITHOUT SCIATICA: ICD-10-CM

## 2025-04-30 DIAGNOSIS — M53.3 SACRAL BACK PAIN: Primary | ICD-10-CM

## 2025-04-30 PROCEDURE — 99204 OFFICE O/P NEW MOD 45 MIN: CPT | Performed by: NURSE PRACTITIONER

## 2025-04-30 NOTE — PROGRESS NOTES
"Chief Complaint  New Patient and Tailbone Pain    Subjective        Pricilla Senior presents to [facility]  History of Present Illness    History of Present Illness  The patient presents to [facility] care.    Intermittent tailbone pain has been reported for several years, with recent intensification. The pain is localized to the lower lumbar region, slightly more pronounced on the left side. No radiating pain is experienced. No specific imaging studies have been done for this issue, and no precipitating injury is recalled. The pain is exacerbated by certain postures, such as standing in a particular way or performing tasks like applying makeup or styling hair. Walking does not cause discomfort. Pain management includes taking ibuprofen at night to facilitate rest and occasionally elevating feet while sitting, which provides some relief. A history of cervical spine fusion is noted, but no other back surgeries have been performed. Physical therapy has been previously undergone for neck-related issues.    Diabetes is currently managed by Dr. Todd, with the most recent A1c level recorded at 6.7. Medication for cholesterol management is being taken. An eye exam is due next month. A routine annual physical has not been done in a long time. Mammograms and colonoscopies have never been performed.    PAST SURGICAL HISTORY:  Cervical spine fusion.    Results  Labs   - A1c: 6.7%     Objective   Vital Signs:  /76 (BP Location: Right arm, Patient Position: Sitting, Cuff Size: Adult)   Pulse 79   Ht 160 cm (62.99\")   Wt 79.5 kg (175 lb 3.2 oz)   SpO2 97%   BMI 31.04 kg/m²   Estimated body mass index is 31.04 kg/m² as calculated from the following:    Height as of this encounter: 160 cm (62.99\").    Weight as of this encounter: 79.5 kg (175 lb 3.2 oz).        Physical Exam  Vitals reviewed.   Constitutional:       Appearance: Normal appearance.   HENT:      Nose: Nose normal.      " Mouth/Throat:      Mouth: Mucous membranes are moist.      Pharynx: Oropharynx is clear.   Eyes:      Conjunctiva/sclera: Conjunctivae normal.   Cardiovascular:      Rate and Rhythm: Normal rate and regular rhythm.      Heart sounds: Normal heart sounds.   Pulmonary:      Effort: Pulmonary effort is normal.      Breath sounds: Normal breath sounds.   Musculoskeletal:         General: Normal range of motion.      Cervical back: Normal range of motion.   Skin:     General: Skin is warm.   Neurological:      Mental Status: She is alert and oriented to person, place, and time.   Psychiatric:         Mood and Affect: Mood normal.         Behavior: Behavior normal.         Thought Content: Thought content normal.        Result Review :                  Assessment and Plan   Diagnoses and all orders for this visit:    1. Sacral back pain (Primary)  -     XR Sacrum & Coccyx (In Office)    2. Chronic midline low back pain without sciatica  -     XR Spine Lumbar 2 or 3 View; Future      Assessment & Plan  1. Tailbone pain.  - Reports chronic tailbone pain that has worsened recently, primarily triggered by certain postures and activities such as standing in a specific way or doing her hair.  - Pain is localized to the bottom of the lumbar area, midline, and slightly towards the left. No history of injury or other back surgeries, although a cervical spinal fusion was performed.  - An x-ray of the lumbar region will be ordered to further investigate the cause of the pain.  - Depending on the results, a referral for physical therapy may be considered.    2. Diabetes mellitus.  - Last A1c was 6.7, which is within the target range set by her endocrinologist, Dr. Todd.  - Currently on medication for cholesterol management, which is common in diabetic patients.  - A follow-up visit will be scheduled in approximately one month for a physical examination and laboratory tests, including a cholesterol panel. She is advised to come  fasting for these tests.  - During this visit, discussions regarding vaccinations, mammograms, colonoscopies, and other routine health maintenance will be conducted.       The following portions of the patient's history were reviewed and updated as appropriate: allergies, current medications, past family history, past medical history, past social history, past surgical history, and problem list.       Follow Up   Return for Annual.  Patient was given instructions and counseling regarding her condition or for health maintenance advice. Please see specific information pulled into the AVS if appropriate.     Patient or patient representative verbalized consent for the use of Ambient Listening during the visit with  WARREN Herrera for chart documentation. 4/30/2025  09:57 EDT

## 2025-05-02 ENCOUNTER — PATIENT ROUNDING (BHMG ONLY) (OUTPATIENT)
Age: 55
End: 2025-05-02
Payer: COMMERCIAL

## 2025-05-29 NOTE — TELEPHONE ENCOUNTER
Rx Refill Note  Requested Prescriptions     Pending Prescriptions Disp Refills    Insulin Glargine (LANTUS SOLOSTAR) 100 UNIT/ML injection pen 45 mL 0     Sig: Inject 50 Units under the skin into the appropriate area as directed Daily.      Last office visit with prescribing clinician: 3/28/2025      Next office visit with prescribing clinician: 10/8/2025       Ct Rojas MA  05/29/25, 12:37 EDT

## 2025-06-04 ENCOUNTER — LAB (OUTPATIENT)
Age: 55
End: 2025-06-04
Payer: COMMERCIAL

## 2025-06-04 ENCOUNTER — OFFICE VISIT (OUTPATIENT)
Age: 55
End: 2025-06-04
Payer: COMMERCIAL

## 2025-06-04 VITALS
OXYGEN SATURATION: 97 % | HEIGHT: 63 IN | BODY MASS INDEX: 31.1 KG/M2 | SYSTOLIC BLOOD PRESSURE: 128 MMHG | DIASTOLIC BLOOD PRESSURE: 82 MMHG | WEIGHT: 175.5 LBS | HEART RATE: 74 BPM

## 2025-06-04 DIAGNOSIS — Z12.11 ENCOUNTER FOR COLORECTAL CANCER SCREENING: ICD-10-CM

## 2025-06-04 DIAGNOSIS — Z00.00 ANNUAL PHYSICAL EXAM: ICD-10-CM

## 2025-06-04 DIAGNOSIS — Z11.59 ENCOUNTER FOR HEPATITIS C SCREENING TEST FOR LOW RISK PATIENT: ICD-10-CM

## 2025-06-04 DIAGNOSIS — Z00.00 ANNUAL PHYSICAL EXAM: Primary | ICD-10-CM

## 2025-06-04 DIAGNOSIS — Z12.12 ENCOUNTER FOR COLORECTAL CANCER SCREENING: ICD-10-CM

## 2025-06-04 DIAGNOSIS — Z12.31 BREAST CANCER SCREENING BY MAMMOGRAM: ICD-10-CM

## 2025-06-04 LAB
ALBUMIN SERPL-MCNC: 4.3 G/DL (ref 3.5–5.2)
ALBUMIN/GLOB SERPL: 1.4 G/DL
ALP SERPL-CCNC: 72 U/L (ref 39–117)
ALT SERPL W P-5'-P-CCNC: 24 U/L (ref 1–33)
ANION GAP SERPL CALCULATED.3IONS-SCNC: 13 MMOL/L (ref 5–15)
AST SERPL-CCNC: 27 U/L (ref 1–32)
BILIRUB SERPL-MCNC: 0.3 MG/DL (ref 0–1.2)
BUN SERPL-MCNC: 21 MG/DL (ref 6–20)
BUN/CREAT SERPL: 26.6 (ref 7–25)
CALCIUM SPEC-SCNC: 9.6 MG/DL (ref 8.6–10.5)
CHLORIDE SERPL-SCNC: 103 MMOL/L (ref 98–107)
CHOLEST SERPL-MCNC: 183 MG/DL (ref 0–200)
CO2 SERPL-SCNC: 23 MMOL/L (ref 22–29)
CREAT SERPL-MCNC: 0.79 MG/DL (ref 0.57–1)
DEPRECATED RDW RBC AUTO: 44.1 FL (ref 37–54)
EGFRCR SERPLBLD CKD-EPI 2021: 89.6 ML/MIN/1.73
ERYTHROCYTE [DISTWIDTH] IN BLOOD BY AUTOMATED COUNT: 13.2 % (ref 12.3–15.4)
GLOBULIN UR ELPH-MCNC: 3 GM/DL
GLUCOSE SERPL-MCNC: 183 MG/DL (ref 65–99)
HCT VFR BLD AUTO: 44.2 % (ref 34–46.6)
HCV AB SER QL: NORMAL
HDLC SERPL-MCNC: 60 MG/DL (ref 40–60)
HGB BLD-MCNC: 14.6 G/DL (ref 12–15.9)
LDLC SERPL CALC-MCNC: 100 MG/DL (ref 0–100)
LDLC/HDLC SERPL: 1.62 {RATIO}
MCH RBC QN AUTO: 30 PG (ref 26.6–33)
MCHC RBC AUTO-ENTMCNC: 33 G/DL (ref 31.5–35.7)
MCV RBC AUTO: 90.9 FL (ref 79–97)
PLATELET # BLD AUTO: 314 10*3/MM3 (ref 140–450)
PMV BLD AUTO: 9.7 FL (ref 6–12)
POTASSIUM SERPL-SCNC: 5 MMOL/L (ref 3.5–5.2)
PROT SERPL-MCNC: 7.3 G/DL (ref 6–8.5)
RBC # BLD AUTO: 4.86 10*6/MM3 (ref 3.77–5.28)
SODIUM SERPL-SCNC: 139 MMOL/L (ref 136–145)
TRIGL SERPL-MCNC: 130 MG/DL (ref 0–150)
VLDLC SERPL-MCNC: 23 MG/DL (ref 5–40)
WBC NRBC COR # BLD AUTO: 5.6 10*3/MM3 (ref 3.4–10.8)

## 2025-06-04 PROCEDURE — 80061 LIPID PANEL: CPT | Performed by: NURSE PRACTITIONER

## 2025-06-04 PROCEDURE — 80053 COMPREHEN METABOLIC PANEL: CPT | Performed by: NURSE PRACTITIONER

## 2025-06-04 PROCEDURE — 86803 HEPATITIS C AB TEST: CPT | Performed by: NURSE PRACTITIONER

## 2025-06-04 PROCEDURE — 36415 COLL VENOUS BLD VENIPUNCTURE: CPT | Performed by: NURSE PRACTITIONER

## 2025-06-04 PROCEDURE — 85027 COMPLETE CBC AUTOMATED: CPT | Performed by: NURSE PRACTITIONER

## 2025-06-04 NOTE — LETTER
[facility]  Vaccine Consent Form    Patient Name:  Pricilla Senior  Patient :  1970     Vaccine(s) Ordered    Tdap Vaccine => 8yo IM (BOOSTRIX/ADACEL)  Hepatitis B Vaccine Adult IM  Pneumococcal Conjugate Vaccine 20-Valent (PCV20)        Screening Checklist  The following questions should be completed prior to vaccination. If you answer “yes” to any question, it does not necessarily mean you should not be vaccinated. It just means we may need to clarify or ask more questions. If a question is unclear, please ask your healthcare provider to explain it.    Yes No   Any fever or moderate to severe illness today (mild illness and/or antibiotic treatment are not contraindications)?     Do you have a history of a serious reaction to any previous vaccinations, such as anaphylaxis, encephalopathy within 7 days, Guillain-[place] Head syndrome within 6 weeks, seizure?     Have you received any live vaccine(s) (e.g MMR, SHEEBA) or any other vaccines in the last month (to ensure duplicate doses aren't given)?     Do you have an anaphylactic allergy to latex (DTaP, DTaP-IPV, Hep A, Hep B, MenB, RV, Td, Tdap), baker’s yeast (Hep B, HPV), polysorbates (RSV, nirsevimab, PCV 20, Rotavirrus, Tdap, Shingrix), or gelatin (SHEEBA, MMR)?     Do you have an anaphylactic allergy to neomycin (Rabies, SHEEBA, MMR, IPV, Hep A), polymyxin B (IPV), or streptomycin (IPV)?      Any cancer, leukemia, AIDS, or other immune system disorder? (SHEEBA, MMR, RV)     Do you have a parent, brother, or sister with an immune system problem (if immune competence of vaccine recipient clinically verified, can proceed)? (MMR, SHEEBA)     Any recent steroid treatments for >2 weeks, chemotherapy, or radiation treatment? (SHEEBA, MMR)     Have you received antibody-containing blood transfusions or IVIG in the past 11 months (recommended interval is dependent on product)? (MMR, SHEEBA)     Have you taken antiviral drugs (acyclovir, famciclovir, valacyclovir for SHEEBA) in the last  "24 or 48 hours, respectively?      Are you pregnant or planning to become pregnant within 1 month? (SHEEBA, MMR, HPV, IPV, MenB, Abrexvy; For Hep B- refer to Engerix-B; For RSV - Abrysvo is indicated for 32-36 weeks of pregnancy from September to January)     For infants, have you ever been told your child has had intussusception or a medical emergency involving obstruction of the intestine (Rotavirus)? If not for an infant, can skip this question.         *Ordering Physicians/APC should be consulted if \"yes\" is checked by the patient or guardian above.  I have received, read, and understand the Vaccine Information Statement (VIS) for each vaccine ordered.  I have considered my or my child's health status as well as the health status of my close contacts.  I have taken the opportunity to discuss my vaccine questions with my or my child's health care provider.   I have requested that the ordered vaccine(s) be given to me or my child.  I understand the benefits and risks of the vaccines.  I understand that I should remain in the clinic for 15 minutes after receiving the vaccine(s).  _________________________________________________________  Signature of Patient or Parent/Legal Guardian ____________________  Date     "

## 2025-06-04 NOTE — PROGRESS NOTES
"Chief Complaint  Annual Exam    Subjective          Pricilla Senior presents to [facility] for   History of Present Illness  History of Present Illness  The patient presents for a physical exam.    She has expressed interest in updating her tetanus vaccine today. She is uncertain about her hepatitis B vaccination status and is open to receiving the vaccine today. Additionally, she is considering the pneumonia vaccine. Her last mammogram was conducted approximately 3 years ago, and she has requested an order for a new one. She underwent a colonoscopy 5 years ago, during which polyps were identified. She was advised to repeat the procedure in 3 years. She reports no issues with constipation or diarrhea.    She has an upcoming appointment with neurosurgery, which she is not overly anxious about. She has previously undergone surgery at the same facility and found the experience to be comfortable.    She is currently under the care of Dr. Alarcon, an endocrinologist, who is managing her diabetes. She is on rosuvastatin.    FAMILY HISTORY  She has a family history of colon cancer, with both her father and aunt having had the disease.    Objective   Vital Signs:   Vitals:    06/04/25 0923 06/04/25 0935   BP: 138/88 128/82   BP Location: Right arm Left arm   Patient Position: Sitting Sitting   Cuff Size: Adult Adult   Pulse: 74    SpO2: 97%    Weight: 79.6 kg (175 lb 8 oz)    Height: 160 cm (62.99\")    PainSc: 7     PainLoc: Back      Body mass index is 31.1 kg/m².        The following portions of the patient's history were reviewed and updated as appropriate: allergies, current medications, past family history, past medical history, past social history, past surgical history, and problem list.      Physical Exam  Vitals and nursing note reviewed.   Constitutional:       Appearance: Normal appearance.   HENT:      Right Ear: Tympanic membrane, ear canal and external ear normal.      Left Ear: " Tympanic membrane, ear canal and external ear normal.      Nose: Nose normal.      Mouth/Throat:      Mouth: Mucous membranes are moist.      Pharynx: Oropharynx is clear.   Eyes:      Conjunctiva/sclera: Conjunctivae normal.      Pupils: Pupils are equal, round, and reactive to light.   Cardiovascular:      Rate and Rhythm: Normal rate and regular rhythm.      Heart sounds: Normal heart sounds.   Pulmonary:      Effort: Pulmonary effort is normal.      Breath sounds: Normal breath sounds.   Abdominal:      General: Bowel sounds are normal.      Palpations: Abdomen is soft.   Musculoskeletal:         General: Normal range of motion.      Cervical back: Normal range of motion and neck supple.   Skin:     General: Skin is warm.   Neurological:      Mental Status: She is alert and oriented to person, place, and time.   Psychiatric:         Mood and Affect: Mood normal.         Behavior: Behavior normal.         Thought Content: Thought content normal.        Result Review :                Immunization History   Administered Date(s) Administered    COVID-19 (MODERNA) 1st,2nd,3rd Dose Monovalent 08/01/2021, 09/01/2021, 01/05/2022    Hepatitis B Adult/Adolescent IM 06/04/2025    Pneumococcal Conjugate 20-Valent (PCV20) 06/04/2025    TD Preservative Free (Tenivac) 05/14/2009    Tdap 06/04/2025       Health Maintenance   Topic Date Due    COLORECTAL CANCER SCREENING  Never done    HEPATITIS C SCREENING  Never done    ZOSTER VACCINE (1 of 2) Never done    MAMMOGRAM  01/22/2023    COVID-19 Vaccine (4 - 2024-25 season) 06/18/2025 (Originally 9/1/2024)    DIABETIC FOOT EXAM  06/19/2025 (Originally 3/11/2025)    DIABETIC EYE EXAM  07/23/2025 (Originally 9/1/2024)    INFLUENZA VACCINE  07/01/2025    Hepatitis B (2 of 3 - 19+ 3-dose series) 07/02/2025    URINE MICROALBUMIN-CREATININE RATIO (uACR)  09/23/2025    HEMOGLOBIN A1C  09/28/2025    ANNUAL PHYSICAL  06/04/2026    TDAP/TD VACCINES (3 - Td or Tdap) 06/04/2035    Pneumococcal  Vaccine 50+  Completed        Assessment and Plan    Diagnoses and all orders for this visit:    1. Annual physical exam (Primary)  -     CBC (No Diff); Future  -     Comprehensive Metabolic Panel; Future  -     Lipid Panel; Future  -     Tdap Vaccine => 6yo IM (BOOSTRIX/ADACEL)  -     Hepatitis B Vaccine Adult IM  -     Pneumococcal Conjugate Vaccine 20-Valent (PCV20)    2. Encounter for hepatitis C screening test for low risk patient  -     Hepatitis C Antibody; Future    3. Breast cancer screening by mammogram  -     Mammo Screening Digital Tomosynthesis Bilateral With CAD; Future    4. Encounter for colorectal cancer screening  -     Ambulatory Referral For Screening Colonoscopy      Assessment & Plan  1. Health Maintenance.  - She will receive the tetanus, hepatitis B, and pneumonia vaccines today.  - A mammogram has been ordered.  - A comprehensive set of labs, including CBC, CMP, cholesterol panel, and hepatitis C screening, will be conducted.  - She has been advised to receive the shingles vaccine at her pharmacy.    2. Colon Cancer Screening.  - She had polyps removed during her last colonoscopy 5 years ago and has a family history of colon cancer.  - A repeat colonoscopy is recommended.  - Discussed the importance of timely screening due to family history.  - Colonoscopy will be scheduled.    3. Diabetes Mellitus.  - Currently under the care of an endocrinologist.  - On rosuvastatin.  - No new symptoms or complications reported.  - Continued management by endocrinology.    4. Back pain.  - Upcoming appointment with neurosurgery.  - Patient expressed mild stress about the appointment.  - Previous positive experience with neurosurgery noted.  - Neurosurgery will send a follow-up note to the primary care provider.    Follow-up  The patient will follow up in 6 months.    Counseling/anticipatory guidance: Nutrition, physical activity, healthy weight, dental health, mental health, eye exam, immunizations,  screenings   - Reviewed immunization records and updating vaccines today. Discussed routine labs. Ordered mammogram and colonoscopy for screening.       Follow Up   Return in about 6 months (around 12/4/2025) for Recheck; chronic medical conditions.  Patient was given instructions and counseling regarding her condition or for health maintenance advice. Please see specific information pulled into the AVS if appropriate.     Patient or patient representative verbalized consent for the use of Ambient Listening during the visit with  WARREN Herrera for chart documentation. 6/4/2025  09:34 EDT

## 2025-06-12 ENCOUNTER — OFFICE VISIT (OUTPATIENT)
Dept: NEUROSURGERY | Facility: CLINIC | Age: 55
End: 2025-06-12
Payer: COMMERCIAL

## 2025-06-12 VITALS
HEIGHT: 63 IN | SYSTOLIC BLOOD PRESSURE: 128 MMHG | BODY MASS INDEX: 30.83 KG/M2 | TEMPERATURE: 97 F | WEIGHT: 174 LBS | DIASTOLIC BLOOD PRESSURE: 80 MMHG

## 2025-06-12 DIAGNOSIS — M54.50 CHRONIC MIDLINE LOW BACK PAIN WITHOUT SCIATICA: Primary | ICD-10-CM

## 2025-06-12 DIAGNOSIS — G89.29 CHRONIC MIDLINE LOW BACK PAIN WITHOUT SCIATICA: Primary | ICD-10-CM

## 2025-06-12 DIAGNOSIS — M48.02 CERVICAL SPINAL STENOSIS: ICD-10-CM

## 2025-06-12 PROCEDURE — 99204 OFFICE O/P NEW MOD 45 MIN: CPT | Performed by: PHYSICIAN ASSISTANT

## 2025-06-12 NOTE — PROGRESS NOTES
Patient: Pricilla Senior  : 1970    Primary Care Provider: Andie Xiong APRN      Chief Complaint: Low back pain    History of Present Illness:       Patient is a very nice 54-year-old female known to the neurosurgical practice for undergoing a C5-6 anterior cervical discectomy and fusion in May 2018.  This was uncomplicated and patient did well with the decrease of her arm pain.  Patient still had a little bit of neck pain thereafter but after being off work her disability her pain is subsided greatly.    Patient has had low back pain for some time but over the last couple of months it has been increasing.    Patient presents today with x-rays for review    Patient denies any weakness or signs of cauda equina and states that there is no radicular pain and it is axial and located in the lower lumbar area    Review of Systems   Constitutional:  Negative for activity change, appetite change, chills, diaphoresis, fatigue, fever and unexpected weight change.   HENT:  Negative for congestion, dental problem, drooling, ear discharge, ear pain, facial swelling, hearing loss, mouth sores, nosebleeds, postnasal drip, rhinorrhea, sinus pressure, sinus pain, sneezing, sore throat, tinnitus, trouble swallowing and voice change.    Eyes:  Negative for photophobia, pain, discharge, redness, itching and visual disturbance.   Respiratory:  Negative for apnea, cough, choking, chest tightness, shortness of breath, wheezing and stridor.    Cardiovascular:  Negative for chest pain, palpitations and leg swelling.   Gastrointestinal:  Negative for abdominal distention, abdominal pain, anal bleeding, blood in stool, constipation, diarrhea, nausea, rectal pain and vomiting.   Endocrine: Negative for cold intolerance, heat intolerance, polydipsia, polyphagia and polyuria.   Genitourinary:  Negative for decreased urine volume, difficulty urinating, dyspareunia, dysuria, enuresis, flank pain, frequency, genital sores, hematuria,  menstrual problem, pelvic pain, urgency, vaginal bleeding, vaginal discharge and vaginal pain.   Musculoskeletal:  Positive for back pain. Negative for arthralgias, gait problem, joint swelling, myalgias, neck pain and neck stiffness.   Skin:  Negative for color change, pallor, rash and wound.   Allergic/Immunologic: Negative for environmental allergies, food allergies and immunocompromised state.   Neurological:  Negative for dizziness, tremors, seizures, syncope, facial asymmetry, speech difficulty, weakness, light-headedness, numbness and headaches.   Hematological:  Negative for adenopathy. Does not bruise/bleed easily.   Psychiatric/Behavioral:  Negative for agitation, behavioral problems, confusion, decreased concentration, dysphoric mood, hallucinations, self-injury, sleep disturbance and suicidal ideas. The patient is not nervous/anxious and is not hyperactive.        Past Medical History:     Past Medical History:   Diagnosis Date    Arthritis     neck    Cervical disc disorder     CTS (carpal tunnel syndrome)     Depression     Diabetes mellitus 2007    Controlled with medications-po and insulin; checks sugars at home daily-running 100-120    Gout     Headache     History of kidney stones     numerous-most passed and 4 extractions     History of stomach ulcers 2000    Hyperlipidemia     Controlled with medications    Hypertension     Kidney stone Jan 1997    Left arm pain     due to neck     Low back pain     Neck pain     PONV (postoperative nausea and vomiting)     after hysterectomy     Retinopathy     due to diabetes mellitus-Abstracted from [facility]    Urinary tract infection 1990    Wears glasses     Well controlled type 2 diabetes mellitus        Family History:     Family History   Problem Relation Age of Onset    Cancer Father         Colon Ca    Diabetes Father     Hypertension Father     Aneurysm Paternal Grandmother         Stomach Az       Social History:    reports that she has never smoked.  "She has never been exposed to tobacco smoke. She has never used smokeless tobacco. She reports that she does not drink alcohol and does not use drugs.   SMOKING STATUS: Non-smoker    Surgical History:     Past Surgical History:   Procedure Laterality Date    ANTERIOR CERVICAL DISCECTOMY W/ FUSION Left 05/09/2018    Procedure: CERVICAL DISCECTOMY ANTERIOR WITH FUSION C5-6 LEFT;  Surgeon: Dawood Avendano MD;  Location: [facility], 1928–1983;  Service: Neurosurgery    CHOLECYSTECTOMY      COLONOSCOPY  2000    COSMETIC SURGERY  2019    Breast reduction    ENDOSCOPY      HYSTERECTOMY  06/2016    with bso     KIDNEY STONE SURGERY      x4    LITHOTRIPSY  1997    NECK SURGERY  05/2018    ACDF    NISSEN FUNDOPLICATION      REDUCTION MAMMAPLASTY Bilateral 11/2019    TUBAL ABDOMINAL LIGATION  2000       Allergies:   Amoxicillin, Chloraprep one step [chlorhexidine gluconate], and Levofloxacin    Physical Exam:    Vital Signs:/80 (BP Location: Right arm, Patient Position: Sitting, Cuff Size: Adult)   Temp 97 °F (36.1 °C) (Infrared)   Ht 160 cm (63\")   Wt 78.9 kg (174 lb)   BMI 30.82 kg/m²    BMI: Body mass index is 30.82 kg/m².     GENERAL:   The patient is in no acute distress, and is able to answer all questions appropriately.  Skin:  The incision is well-healed and well approximated.  No signs of infection, bleeding, or erythema.  Musculoskeletal:     strength is 5 out of 5 bilaterally.   Shoulder abduction is 5 out of 5.    Dorsiflexion is 5/5 Bilaterally  Plantarflexion is 5/5 bilaterally  Hip Flexion 5/5 bilaterally.    The patient's gait is normal without antalgia.  Neurologic:   The patient is alert and oriented by 3.     Pupils are equal and reactive to light.    Visual fields are full.    Extraocular movements are intact without nystagmus.    There is no evidence of central motor drift. No facial droop.  No difficulty with rapid alternating movements.    Sensation is equal bilaterally with no deficit.    "   Reflexes:  3+ @ biceps, triceps, brachioradialis, as well as the patellar and Achilles tendon bilaterally.  No sign of Prieto's, clonus, or long track signs      Medical Decision Making    Data Review:   X-rays of the lumbar spine AP and lateral reviewed showing bone-on-bone deformity L5-S1 with minimal degenerative facet changes or foraminal stenosis that is obvious on the x-rays.  The remainder of the lumbar spine shows good disc height    Diagnosis:   L5-S1 degenerative disc disease  \Chronic low back pain  History of C5-6 ACDF 2018 with Juan Alberto    Treatment Options:   From a neurosurgical perspective I think it is prudent that we get a flexion-extension x-ray to make sure there is no instability we also get an MRI of the lumbar spine to see about Modic endplate changes etc. to make sure there is no foraminal stenosis or central canal narrowing that would be associated.    I suspect that her central canal and foramina will be patent secondary to there being no neurogenic claudication or sciatica type pain.    I suspect that injections will be the best course of action in the facets to see if we can locate the area that is giving her issue.    If she does get good relief, but it is not get durable relief, the possibility of ALIF is not unreasonable.  Patient really wishes to avoid surgery however    BMI is >= 30 and <35. (Class 1 Obesity). The following options were offered after discussion;: weight loss educational material (shared in after visit summary)     Diagnosis Plan   1. Chronic midline low back pain without sciatica  XR Spine Lumbar Complete With Flex & Ext    MRI Lumbar Spine Without Contrast      2. Cervical spinal stenosis

## 2025-06-27 LAB
NCCN CRITERIA FLAG: NORMAL
TYRER CUZICK SCORE: 7.5

## 2025-06-28 ENCOUNTER — HOSPITAL ENCOUNTER (OUTPATIENT)
Facility: HOSPITAL | Age: 55
Discharge: HOME | End: 2025-06-28
Payer: COMMERCIAL

## 2025-06-28 DIAGNOSIS — M54.50 CHRONIC MIDLINE LOW BACK PAIN WITHOUT SCIATICA: ICD-10-CM

## 2025-06-28 DIAGNOSIS — G89.29 CHRONIC MIDLINE LOW BACK PAIN WITHOUT SCIATICA: ICD-10-CM

## 2025-06-28 PROCEDURE — 72148 MRI LUMBAR SPINE W/O DYE: CPT

## 2025-06-30 ENCOUNTER — HOSPITAL ENCOUNTER (OUTPATIENT)
Facility: HOSPITAL | Age: 55
Discharge: HOME | End: 2025-06-30
Admitting: NURSE PRACTITIONER
Payer: COMMERCIAL

## 2025-06-30 DIAGNOSIS — Z12.31 BREAST CANCER SCREENING BY MAMMOGRAM: ICD-10-CM

## 2025-06-30 PROCEDURE — 77063 BREAST TOMOSYNTHESIS BI: CPT

## 2025-06-30 PROCEDURE — 77067 SCR MAMMO BI INCL CAD: CPT

## 2025-07-16 ENCOUNTER — TELEPHONE (OUTPATIENT)
Dept: NEUROSURGERY | Facility: CLINIC | Age: 55
End: 2025-07-16
Payer: COMMERCIAL

## 2025-07-16 DIAGNOSIS — M54.50 CHRONIC MIDLINE LOW BACK PAIN WITHOUT SCIATICA: Primary | ICD-10-CM

## 2025-07-16 DIAGNOSIS — G89.29 CHRONIC MIDLINE LOW BACK PAIN WITHOUT SCIATICA: Primary | ICD-10-CM

## 2025-07-16 NOTE — TELEPHONE ENCOUNTER
"This is a patient of Dr. Avendano (not Dr. Mckinnon).      Per last OV, \"I suspect that injections will be the best course of action in the facets to see if we can locate the area that is giving her issue.     If she does get good relief, but it is not get durable relief, the possibility of ALIF is not unreasonable.  Patient really wishes to avoid surgery however\".     Pt still needs to complete lumbar flex/ext XR.      Order for PM placed.  Please get pt set up.  She will need to follow up with Vickey or Dameon after injections.    "

## 2025-07-16 NOTE — TELEPHONE ENCOUNTER
I have talked with the patient and she would like to go to pain management with [facility] in [place].  I have also reminded her to get the x-ray that most likely pain management would need that.  She will call back for further scheduling after the injections.

## 2025-07-16 NOTE — TELEPHONE ENCOUNTER
Caller: LEOBARDO MANNING    Relationship: SELF    Best call back number: [phone]    What is the best time to reach you: ANYTIME    Who are you requesting to speak with (clinical staff, provider,  specific staff member): CLINICAL STAFF    What was the call regarding: PT COMPLETED MRI MRI Lumbar Spine Without Contrast (06/28/2025 12:23) WOULD LIKE TO KNOW IF PAIN MANAGEMENT IS NEEDED AS DR BLANCAS VERBALLY MENTIONED IT IN LAST OFFICE VISIT Office Visit with Vickey Russo PA-C (06/12/2025) PT WOULD ALSO LIKE TO KNOW IF SHE NEEDS A F/U APPT W/ DR BLANCAS?    Is it okay if the provider responds through MyChart: EITHER MYCHART OR CALL BACK

## 2025-08-18 ENCOUNTER — LAB REQUISITION (OUTPATIENT)
Dept: LAB | Facility: HOSPITAL | Age: 55
End: 2025-08-18
Payer: COMMERCIAL

## 2025-08-18 ENCOUNTER — OUTSIDE FACILITY SERVICE (OUTPATIENT)
Dept: GASTROENTEROLOGY | Facility: CLINIC | Age: 55
End: 2025-08-18
Payer: COMMERCIAL

## 2025-08-18 DIAGNOSIS — Z12.11 ENCOUNTER FOR SCREENING FOR MALIGNANT NEOPLASM OF COLON: ICD-10-CM

## 2025-08-18 PROCEDURE — 45385 COLONOSCOPY W/LESION REMOVAL: CPT | Performed by: INTERNAL MEDICINE

## 2025-08-18 PROCEDURE — 88305 TISSUE EXAM BY PATHOLOGIST: CPT | Performed by: INTERNAL MEDICINE

## 2025-08-18 PROCEDURE — 45380 COLONOSCOPY AND BIOPSY: CPT | Performed by: INTERNAL MEDICINE

## 2025-08-20 LAB
CYTO UR: NORMAL
LAB AP CASE REPORT: NORMAL
LAB AP CLINICAL INFORMATION: NORMAL
PATH REPORT.FINAL DX SPEC: NORMAL
PATH REPORT.GROSS SPEC: NORMAL

## 2025-08-28 ENCOUNTER — HOSPITAL ENCOUNTER (OUTPATIENT)
Facility: HOSPITAL | Age: 55
Discharge: HOME | End: 2025-08-28
Admitting: PHYSICIAN ASSISTANT
Payer: COMMERCIAL

## 2025-08-28 ENCOUNTER — TELEPHONE (OUTPATIENT)
Dept: PAIN MEDICINE | Facility: CLINIC | Age: 55
End: 2025-08-28
Payer: COMMERCIAL

## 2025-08-28 ENCOUNTER — OFFICE VISIT (OUTPATIENT)
Dept: PAIN MEDICINE | Facility: CLINIC | Age: 55
End: 2025-08-28
Payer: COMMERCIAL

## 2025-08-28 VITALS — WEIGHT: 178.7 LBS | BODY MASS INDEX: 31.66 KG/M2 | HEIGHT: 63 IN

## 2025-08-28 DIAGNOSIS — M62.85 DYSFUNCTION OF THE MULTIFIDUS MUSCLE OF LUMBAR REGION: ICD-10-CM

## 2025-08-28 DIAGNOSIS — M48.07 NEURAL FORAMINAL STENOSIS OF LUMBOSACRAL SPINE: ICD-10-CM

## 2025-08-28 DIAGNOSIS — M51.370 DEGENERATION OF INTERVERTEBRAL DISC OF LUMBOSACRAL REGION WITH DISCOGENIC BACK PAIN: ICD-10-CM

## 2025-08-28 DIAGNOSIS — G89.29 CHRONIC MIDLINE LOW BACK PAIN WITHOUT SCIATICA: ICD-10-CM

## 2025-08-28 DIAGNOSIS — M47.816 SPONDYLOSIS OF LUMBAR REGION WITHOUT MYELOPATHY OR RADICULOPATHY: ICD-10-CM

## 2025-08-28 DIAGNOSIS — M54.50 CHRONIC MIDLINE LOW BACK PAIN WITHOUT SCIATICA: ICD-10-CM

## 2025-08-28 PROCEDURE — 99204 OFFICE O/P NEW MOD 45 MIN: CPT | Performed by: ANESTHESIOLOGY

## 2025-08-28 PROCEDURE — 72114 X-RAY EXAM L-S SPINE BENDING: CPT

## 2025-08-29 ENCOUNTER — PATIENT ROUNDING (BHMG ONLY) (OUTPATIENT)
Dept: PAIN MEDICINE | Facility: CLINIC | Age: 55
End: 2025-08-29
Payer: COMMERCIAL

## (undated) DEVICE — GLV SURG BIOGEL LTX PF 8

## (undated) DEVICE — SYR CONTRL LUERLOK 10CC

## (undated) DEVICE — MEDI-VAC NON-CONDUCTIVE SUCTION TUBING: Brand: CARDINAL HEALTH

## (undated) DEVICE — DRP MICROSCOP ELIMINATES GLAS COVR

## (undated) DEVICE — PK NEURO DISC 10

## (undated) DEVICE — PAD GRND REM POLYHESIVE A/ DISP

## (undated) DEVICE — MAGNETIC DRAPE: Brand: DEVON

## (undated) DEVICE — DISPOSABLE BIPOLAR FORCEPS 7 3/4" (19.7CM) SCOVILLE BAYONET, INSULATED, 1.5MM TIP AND 12 FT. (3.6M) CABLE: Brand: KIRWAN

## (undated) DEVICE — NDL HYPO ECLPS SFTY 25G 1 1/2IN

## (undated) DEVICE — APPL CHLORAPREP W/TINT 26ML BLU

## (undated) DEVICE — ADHS LIQ MASTISOL 2/3ML

## (undated) DEVICE — SUCTION CANISTER, 2500CC, RIGID: Brand: DEROYAL

## (undated) DEVICE — ANTIBACTERIAL UNDYED BRAIDED (POLYGLACTIN 910), SYNTHETIC ABSORBABLE SUTURE: Brand: COATED VICRYL

## (undated) DEVICE — LARYNG GLIDESCOPE COBALT/RANGER GVL3ST

## (undated) DEVICE — GLOW 'N TELL 30CM TAPE (20 STRIPS): Brand: VASCUTAPE RADIOPAQUE TAPE

## (undated) DEVICE — INSTRUMENT 875-088 14MM DSTRCT PIN STRL: Brand: MEDTRONIC REUSABLE INSTRUMENT

## (undated) DEVICE — SKYLINE ANTERIOR CERVICAL PLATE SYSTEM DRILL 2.2 X 12MM: Brand: SKYLINE

## (undated) DEVICE — ENCORE® LATEX MICRO SIZE 8, STERILE LATEX POWDER-FREE SURGICAL GLOVE: Brand: ENCORE

## (undated) DEVICE — CANNULA,OXY,ADULT,SUPERSOFT,W/7'TUB,UC: Brand: MEDLINE

## (undated) DEVICE — KITTNER SPONGE: Brand: DEROYAL

## (undated) DEVICE — SUT SILK 2/0 TIES 18IN A185H

## (undated) DEVICE — DRSNG WND BORDR/ADHS NONADHR/GZ LF 4X4IN STRL

## (undated) DEVICE — C-ARM: Brand: UNBRANDED

## (undated) DEVICE — SURGICAL INSTRUMENTS COMPRESSION PIN 14MM
Type: IMPLANTABLE DEVICE | Status: NON-FUNCTIONAL
Removed: 2018-05-09

## (undated) DEVICE — ELECTRD BLD EDGE/INSUL1P 2.4X5.1MM STRL

## (undated) DEVICE — MEDI-VAC YANKAUER SUCTION HANDLE W/BULBOUS TIP: Brand: CARDINAL HEALTH

## (undated) DEVICE — SPNG GZ WOVN 4X4IN 12PLY 10/BX STRL

## (undated) DEVICE — 3M™ STERI-DRAPE™ INSTRUMENT POUCH 1018: Brand: STERI-DRAPE™

## (undated) DEVICE — NDL SPINE 20G 3 1/2 YEL STRL 1P/U

## (undated) DEVICE — RUBBERBAND LF STRL PK/2

## (undated) DEVICE — LIMB HOLDERS: Brand: DEROYAL

## (undated) DEVICE — DRP SLUSH WARM STRL 44X44IN

## (undated) DEVICE — AIRWY 90MM NO9

## (undated) DEVICE — 3M™ STERI-STRIP™ REINFORCED ADHESIVE SKIN CLOSURES, R1547, 1/2 IN X 4 IN (12 MM X 100 MM), 6 STRIPS/ENVELOPE: Brand: 3M™ STERI-STRIP™

## (undated) DEVICE — COVADERM: Brand: DEROYAL

## (undated) DEVICE — TOOL 14MH30 LEGEND 14CM 3MM: Brand: MIDAS REX ™